# Patient Record
Sex: FEMALE | Race: WHITE | Employment: OTHER | URBAN - METROPOLITAN AREA
[De-identification: names, ages, dates, MRNs, and addresses within clinical notes are randomized per-mention and may not be internally consistent; named-entity substitution may affect disease eponyms.]

---

## 2017-06-21 ENCOUNTER — APPOINTMENT (OUTPATIENT)
Dept: GENERAL RADIOLOGY | Age: 77
DRG: 481 | End: 2017-06-21
Attending: EMERGENCY MEDICINE
Payer: MEDICARE

## 2017-06-21 ENCOUNTER — ANESTHESIA EVENT (OUTPATIENT)
Dept: SURGERY | Age: 77
DRG: 481 | End: 2017-06-21
Payer: MEDICARE

## 2017-06-21 ENCOUNTER — HOSPITAL ENCOUNTER (INPATIENT)
Age: 77
LOS: 5 days | Discharge: NURSING, REHAB OR LONG-TERM CARE | DRG: 481 | End: 2017-06-26
Attending: EMERGENCY MEDICINE | Admitting: EMERGENCY MEDICINE
Payer: MEDICARE

## 2017-06-21 ENCOUNTER — APPOINTMENT (OUTPATIENT)
Dept: GENERAL RADIOLOGY | Age: 77
DRG: 481 | End: 2017-06-21
Attending: HOSPITALIST
Payer: MEDICARE

## 2017-06-21 DIAGNOSIS — M25.552 ACUTE HIP PAIN, LEFT: ICD-10-CM

## 2017-06-21 DIAGNOSIS — S72.002A CLOSED LEFT HIP FRACTURE, INITIAL ENCOUNTER (HCC): ICD-10-CM
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
## 2017-06-21 PROBLEM — F03.90 DEMENTIA (HCC): Status: ACTIVE | Noted: 2017-06-21

## 2017-06-21 PROBLEM — E87.1 HYPONATREMIA: Status: ACTIVE | Noted: 2017-06-21

## 2017-06-21 LAB
ALBUMIN SERPL BCP-MCNC: 3.6 G/DL (ref 3.4–5)
ALBUMIN/GLOB SERPL: 1.4 {RATIO} (ref 0.8–1.7)
ALP SERPL-CCNC: 42 U/L (ref 45–117)
ALT SERPL-CCNC: 28 U/L (ref 13–56)
ANION GAP BLD CALC-SCNC: 10 MMOL/L (ref 3–18)
ANION GAP BLD CALC-SCNC: 12 MMOL/L (ref 3–18)
ANION GAP BLD CALC-SCNC: 12 MMOL/L (ref 3–18)
ANION GAP BLD CALC-SCNC: 14 MMOL/L (ref 3–18)
ANION GAP BLD CALC-SCNC: 17 MMOL/L (ref 3–18)
APPEARANCE UR: CLEAR
APTT PPP: 29.3 SEC (ref 23–36.4)
AST SERPL W P-5'-P-CCNC: 46 U/L (ref 15–37)
BASOPHILS # BLD AUTO: 0 K/UL (ref 0–0.1)
BASOPHILS # BLD: 0 % (ref 0–3)
BILIRUB SERPL-MCNC: 0.3 MG/DL (ref 0.2–1)
BILIRUB UR QL: NEGATIVE
BUN SERPL-MCNC: 19 MG/DL (ref 7–18)
BUN SERPL-MCNC: 19 MG/DL (ref 7–18)
BUN SERPL-MCNC: 20 MG/DL (ref 7–18)
BUN SERPL-MCNC: 21 MG/DL (ref 7–18)
BUN SERPL-MCNC: 25 MG/DL (ref 7–18)
BUN/CREAT SERPL: 12 (ref 12–20)
BUN/CREAT SERPL: 13 (ref 12–20)
CALCIUM SERPL-MCNC: 7.4 MG/DL (ref 8.5–10.1)
CALCIUM SERPL-MCNC: 7.4 MG/DL (ref 8.5–10.1)
CALCIUM SERPL-MCNC: 7.5 MG/DL (ref 8.5–10.1)
CALCIUM SERPL-MCNC: 7.7 MG/DL (ref 8.5–10.1)
CALCIUM SERPL-MCNC: 8.3 MG/DL (ref 8.5–10.1)
CHLORIDE SERPL-SCNC: 92 MMOL/L (ref 100–108)
CHLORIDE SERPL-SCNC: 96 MMOL/L (ref 100–108)
CHLORIDE SERPL-SCNC: 97 MMOL/L (ref 100–108)
CHLORIDE SERPL-SCNC: 97 MMOL/L (ref 100–108)
CHLORIDE SERPL-SCNC: 98 MMOL/L (ref 100–108)
CK MB CFR SERPL CALC: NORMAL % (ref 0–4)
CK MB SERPL-MCNC: <1 NG/ML (ref 5–25)
CK SERPL-CCNC: 35 U/L (ref 26–192)
CO2 SERPL-SCNC: 13 MMOL/L (ref 21–32)
CO2 SERPL-SCNC: 16 MMOL/L (ref 21–32)
CO2 SERPL-SCNC: 17 MMOL/L (ref 21–32)
CO2 SERPL-SCNC: 18 MMOL/L (ref 21–32)
CO2 SERPL-SCNC: 19 MMOL/L (ref 21–32)
COLOR UR: YELLOW
CREAT SERPL-MCNC: 1.56 MG/DL (ref 0.6–1.3)
CREAT SERPL-MCNC: 1.57 MG/DL (ref 0.6–1.3)
CREAT SERPL-MCNC: 1.59 MG/DL (ref 0.6–1.3)
CREAT SERPL-MCNC: 1.78 MG/DL (ref 0.6–1.3)
CREAT SERPL-MCNC: 2.06 MG/DL (ref 0.6–1.3)
DIFFERENTIAL METHOD BLD: ABNORMAL
EOSINOPHIL # BLD: 0 K/UL (ref 0–0.4)
EOSINOPHIL NFR BLD: 0 % (ref 0–5)
ERYTHROCYTE [DISTWIDTH] IN BLOOD BY AUTOMATED COUNT: 12.4 % (ref 11.6–14.5)
GLOBULIN SER CALC-MCNC: 2.5 G/DL (ref 2–4)
GLUCOSE SERPL-MCNC: 101 MG/DL (ref 74–99)
GLUCOSE SERPL-MCNC: 166 MG/DL (ref 74–99)
GLUCOSE SERPL-MCNC: 77 MG/DL (ref 74–99)
GLUCOSE SERPL-MCNC: 80 MG/DL (ref 74–99)
GLUCOSE SERPL-MCNC: 82 MG/DL (ref 74–99)
GLUCOSE UR STRIP.AUTO-MCNC: NEGATIVE MG/DL
HCT VFR BLD AUTO: 27.6 % (ref 35–45)
HGB BLD-MCNC: 9.8 G/DL (ref 12–16)
HGB UR QL STRIP: NEGATIVE
INR PPP: 1.2 (ref 0.8–1.2)
KETONES UR QL STRIP.AUTO: NEGATIVE MG/DL
LEUKOCYTE ESTERASE UR QL STRIP.AUTO: NEGATIVE
LYMPHOCYTES # BLD AUTO: 4 % (ref 20–51)
LYMPHOCYTES # BLD: 0.9 K/UL (ref 0.8–3.5)
MCH RBC QN AUTO: 36.2 PG (ref 24–34)
MCHC RBC AUTO-ENTMCNC: 35.5 G/DL (ref 31–37)
MCV RBC AUTO: 101.8 FL (ref 74–97)
MONOCYTES # BLD: 1.6 K/UL (ref 0–1)
MONOCYTES NFR BLD AUTO: 7 % (ref 2–9)
NEUTS BAND NFR BLD MANUAL: 6 % (ref 0–5)
NEUTS SEG # BLD: 18.8 K/UL (ref 1.8–8)
NEUTS SEG NFR BLD AUTO: 83 % (ref 42–75)
NITRITE UR QL STRIP.AUTO: NEGATIVE
PH UR STRIP: 5 [PH] (ref 5–8)
PLATELET # BLD AUTO: 147 K/UL (ref 135–420)
PMV BLD AUTO: 11.1 FL (ref 9.2–11.8)
POTASSIUM SERPL-SCNC: 5 MMOL/L (ref 3.5–5.5)
POTASSIUM SERPL-SCNC: 5.1 MMOL/L (ref 3.5–5.5)
POTASSIUM SERPL-SCNC: 5.4 MMOL/L (ref 3.5–5.5)
POTASSIUM SERPL-SCNC: 5.6 MMOL/L (ref 3.5–5.5)
POTASSIUM SERPL-SCNC: 5.7 MMOL/L (ref 3.5–5.5)
PROT SERPL-MCNC: 6.1 G/DL (ref 6.4–8.2)
PROT UR STRIP-MCNC: NEGATIVE MG/DL
PROTHROMBIN TIME: 14.4 SEC (ref 11.5–15.2)
RBC # BLD AUTO: 2.71 M/UL (ref 4.2–5.3)
RBC MORPH BLD: ABNORMAL
RBC MORPH BLD: ABNORMAL
SODIUM SERPL-SCNC: 122 MMOL/L (ref 136–145)
SODIUM SERPL-SCNC: 126 MMOL/L (ref 136–145)
SODIUM SERPL-SCNC: 126 MMOL/L (ref 136–145)
SODIUM SERPL-SCNC: 127 MMOL/L (ref 136–145)
SODIUM SERPL-SCNC: 127 MMOL/L (ref 136–145)
SP GR UR REFRACTOMETRY: 1.01 (ref 1–1.03)
TROPONIN I SERPL-MCNC: <0.02 NG/ML (ref 0–0.06)
UROBILINOGEN UR QL STRIP.AUTO: 0.2 EU/DL (ref 0.2–1)
WBC # BLD AUTO: 22.7 K/UL (ref 4.6–13.2)
WBC MORPH BLD: ABNORMAL

## 2017-06-21 PROCEDURE — 74011250636 HC RX REV CODE- 250/636: Performed by: HOSPITALIST

## 2017-06-21 PROCEDURE — 81003 URINALYSIS AUTO W/O SCOPE: CPT | Performed by: HOSPITALIST

## 2017-06-21 PROCEDURE — 86900 BLOOD TYPING SEROLOGIC ABO: CPT | Performed by: EMERGENCY MEDICINE

## 2017-06-21 PROCEDURE — 85025 COMPLETE CBC W/AUTO DIFF WBC: CPT | Performed by: EMERGENCY MEDICINE

## 2017-06-21 PROCEDURE — 73562 X-RAY EXAM OF KNEE 3: CPT

## 2017-06-21 PROCEDURE — 80053 COMPREHEN METABOLIC PANEL: CPT | Performed by: EMERGENCY MEDICINE

## 2017-06-21 PROCEDURE — 74011000250 HC RX REV CODE- 250: Performed by: HOSPITALIST

## 2017-06-21 PROCEDURE — 73502 X-RAY EXAM HIP UNI 2-3 VIEWS: CPT

## 2017-06-21 PROCEDURE — 74011250636 HC RX REV CODE- 250/636: Performed by: EMERGENCY MEDICINE

## 2017-06-21 PROCEDURE — 77030034849

## 2017-06-21 PROCEDURE — 84443 ASSAY THYROID STIM HORMONE: CPT | Performed by: HOSPITALIST

## 2017-06-21 PROCEDURE — 77010033678 HC OXYGEN DAILY

## 2017-06-21 PROCEDURE — 74011250637 HC RX REV CODE- 250/637: Performed by: HOSPITALIST

## 2017-06-21 PROCEDURE — 36415 COLL VENOUS BLD VENIPUNCTURE: CPT | Performed by: HOSPITALIST

## 2017-06-21 PROCEDURE — 96374 THER/PROPH/DIAG INJ IV PUSH: CPT

## 2017-06-21 PROCEDURE — 65270000029 HC RM PRIVATE

## 2017-06-21 PROCEDURE — 99285 EMERGENCY DEPT VISIT HI MDM: CPT

## 2017-06-21 PROCEDURE — 96376 TX/PRO/DX INJ SAME DRUG ADON: CPT

## 2017-06-21 PROCEDURE — 96361 HYDRATE IV INFUSION ADD-ON: CPT

## 2017-06-21 PROCEDURE — 93005 ELECTROCARDIOGRAM TRACING: CPT

## 2017-06-21 PROCEDURE — 82550 ASSAY OF CK (CPK): CPT | Performed by: EMERGENCY MEDICINE

## 2017-06-21 PROCEDURE — 85730 THROMBOPLASTIN TIME PARTIAL: CPT | Performed by: EMERGENCY MEDICINE

## 2017-06-21 PROCEDURE — 71010 XR CHEST PORT: CPT

## 2017-06-21 PROCEDURE — 80048 BASIC METABOLIC PNL TOTAL CA: CPT | Performed by: HOSPITALIST

## 2017-06-21 PROCEDURE — 74011250636 HC RX REV CODE- 250/636

## 2017-06-21 PROCEDURE — 86920 COMPATIBILITY TEST SPIN: CPT | Performed by: EMERGENCY MEDICINE

## 2017-06-21 PROCEDURE — 85610 PROTHROMBIN TIME: CPT | Performed by: EMERGENCY MEDICINE

## 2017-06-21 RX ORDER — DONEPEZIL HYDROCHLORIDE 5 MG/1
5 TABLET, FILM COATED ORAL
Status: DISCONTINUED | OUTPATIENT
Start: 2017-06-22 | End: 2017-06-26 | Stop reason: HOSPADM

## 2017-06-21 RX ORDER — SODIUM CHLORIDE TAB 1 GM 1 G
1 TAB MISCELLANEOUS DAILY
COMMUNITY

## 2017-06-21 RX ORDER — RANITIDINE 150 MG/1
150 TABLET, FILM COATED ORAL 2 TIMES DAILY
Status: DISCONTINUED | OUTPATIENT
Start: 2017-06-21 | End: 2017-06-21 | Stop reason: CLARIF

## 2017-06-21 RX ORDER — RANITIDINE 150 MG/1
150 TABLET, FILM COATED ORAL 2 TIMES DAILY
COMMUNITY

## 2017-06-21 RX ORDER — KETOROLAC TROMETHAMINE 30 MG/ML
30 INJECTION, SOLUTION INTRAMUSCULAR; INTRAVENOUS
Status: DISCONTINUED | OUTPATIENT
Start: 2017-06-21 | End: 2017-06-26 | Stop reason: HOSPADM

## 2017-06-21 RX ORDER — SODIUM CHLORIDE TAB 1 GM 1 G
1 TAB MISCELLANEOUS DAILY
Status: DISCONTINUED | OUTPATIENT
Start: 2017-06-21 | End: 2017-06-26 | Stop reason: HOSPADM

## 2017-06-21 RX ORDER — HYDROXYUREA 500 MG/1
500 CAPSULE ORAL DAILY
Status: ON HOLD | COMMUNITY
End: 2017-06-21

## 2017-06-21 RX ORDER — SODIUM POLYSTYRENE SULFONATE 15 G/60ML
15 SUSPENSION ORAL; RECTAL
Status: COMPLETED | OUTPATIENT
Start: 2017-06-21 | End: 2017-06-21

## 2017-06-21 RX ORDER — FAMOTIDINE 20 MG/1
20 TABLET, FILM COATED ORAL 2 TIMES DAILY
Status: DISCONTINUED | OUTPATIENT
Start: 2017-06-21 | End: 2017-06-26 | Stop reason: HOSPADM

## 2017-06-21 RX ORDER — ONDANSETRON 2 MG/ML
4 INJECTION INTRAMUSCULAR; INTRAVENOUS
Status: DISCONTINUED | OUTPATIENT
Start: 2017-06-21 | End: 2017-06-26 | Stop reason: SDUPTHER

## 2017-06-21 RX ORDER — BISMUTH SUBSALICYLATE 262 MG
1 TABLET,CHEWABLE ORAL DAILY
COMMUNITY

## 2017-06-21 RX ORDER — LATANOPROST 50 UG/ML
1 SOLUTION/ DROPS OPHTHALMIC
Status: DISCONTINUED | OUTPATIENT
Start: 2017-06-21 | End: 2017-06-26 | Stop reason: HOSPADM

## 2017-06-21 RX ORDER — MORPHINE SULFATE 2 MG/ML
1 INJECTION, SOLUTION INTRAMUSCULAR; INTRAVENOUS
Status: DISCONTINUED | OUTPATIENT
Start: 2017-06-21 | End: 2017-06-26 | Stop reason: HOSPADM

## 2017-06-21 RX ORDER — DONEPEZIL HYDROCHLORIDE 5 MG/1
5 TABLET, FILM COATED ORAL
Status: ON HOLD | COMMUNITY
End: 2017-06-21

## 2017-06-21 RX ORDER — ONDANSETRON 4 MG/1
4 TABLET, FILM COATED ORAL
COMMUNITY

## 2017-06-21 RX ORDER — SODIUM CHLORIDE 9 MG/ML
100 INJECTION, SOLUTION INTRAVENOUS CONTINUOUS
Status: DISCONTINUED | OUTPATIENT
Start: 2017-06-21 | End: 2017-06-23

## 2017-06-21 RX ORDER — PANTOPRAZOLE SODIUM 40 MG/1
40 TABLET, DELAYED RELEASE ORAL DAILY
Status: CANCELLED | OUTPATIENT
Start: 2017-06-21

## 2017-06-21 RX ORDER — MORPHINE SULFATE 2 MG/ML
INJECTION, SOLUTION INTRAMUSCULAR; INTRAVENOUS
Status: COMPLETED
Start: 2017-06-21 | End: 2017-06-21

## 2017-06-21 RX ORDER — MORPHINE SULFATE 2 MG/ML
2 INJECTION, SOLUTION INTRAMUSCULAR; INTRAVENOUS
Status: DISCONTINUED | OUTPATIENT
Start: 2017-06-21 | End: 2017-06-21

## 2017-06-21 RX ORDER — MORPHINE SULFATE 2 MG/ML
1 INJECTION, SOLUTION INTRAMUSCULAR; INTRAVENOUS
Status: DISCONTINUED | OUTPATIENT
Start: 2017-06-21 | End: 2017-06-21

## 2017-06-21 RX ORDER — ERGOCALCIFEROL 1.25 MG/1
50000 CAPSULE ORAL
COMMUNITY

## 2017-06-21 RX ORDER — ACETAMINOPHEN 325 MG/1
650 TABLET ORAL
Status: DISCONTINUED | OUTPATIENT
Start: 2017-06-21 | End: 2017-06-26 | Stop reason: HOSPADM

## 2017-06-21 RX ORDER — DIAZEPAM 2 MG/1
2 TABLET ORAL
Status: DISCONTINUED | OUTPATIENT
Start: 2017-06-21 | End: 2017-06-26 | Stop reason: HOSPADM

## 2017-06-21 RX ADMIN — DIAZEPAM 2 MG: 2 TABLET ORAL at 15:57

## 2017-06-21 RX ADMIN — Medication 1 MG: at 09:57

## 2017-06-21 RX ADMIN — SODIUM CHLORIDE 100 ML/HR: 900 INJECTION, SOLUTION INTRAVENOUS at 06:10

## 2017-06-21 RX ADMIN — MULTIPLE VITAMINS W/ MINERALS TAB 1 TABLET: TAB at 15:40

## 2017-06-21 RX ADMIN — LATANOPROST 1 DROP: 50 SOLUTION OPHTHALMIC at 22:54

## 2017-06-21 RX ADMIN — FAMOTIDINE 20 MG: 20 TABLET ORAL at 20:41

## 2017-06-21 RX ADMIN — SODIUM CHLORIDE TAB 1 GM 1 G: 1 TAB at 15:39

## 2017-06-21 RX ADMIN — SODIUM CHLORIDE 1000 ML: 900 INJECTION, SOLUTION INTRAVENOUS at 01:30

## 2017-06-21 RX ADMIN — Medication 2 MG: at 04:00

## 2017-06-21 RX ADMIN — Medication 1 MG: at 20:41

## 2017-06-21 RX ADMIN — Medication 1 MG: at 17:58

## 2017-06-21 RX ADMIN — Medication 2 MG: at 01:30

## 2017-06-21 RX ADMIN — DIAZEPAM 2 MG: 2 TABLET ORAL at 07:34

## 2017-06-21 RX ADMIN — SODIUM POLYSTYRENE SULFONATE 15 G: 15 SUSPENSION ORAL; RECTAL at 20:42

## 2017-06-21 RX ADMIN — Medication 1 MG: at 13:36

## 2017-06-21 NOTE — CONSULTS
Consult Note    Patient: Emma Brandt               Sex: female          DOA: 6/21/2017         YOB: 1940      Age:  64 y.o.        LOS:  LOS: 0 days              HPI:     Emma Brandt is a 64 y.o. female who has been seen for left hip pain after witnessed fall at home  Patient transported to Ed for evaluation  Noted left hip fracture  Complicated medical history admitted to medical service  Noted dehydration and hyponatremia  Pending clearance for OR    Past Medical History:   Diagnosis Date    Autoimmune disease ([address]     Chron's    Calculus of kidney     Chronic kidney disease     stage 3   from stone    Chronic kidney disease     Closed left hip fracture ([address] 6/21/2017    Crohn's disease ([address]     Epiretinal membrane     Glaucoma     Glaucoma     Gout     Hearing loss     Hernia of abdominal cavity     History of esophagogastroduodenoscopy (EGD)     Hypertension 2000    Ileostomy present ([address]     Memory problem     Menopause     Myelodysplasia (myelodysplastic syndrome) ([address]     Osteopenia        Past Surgical History:   Procedure Laterality Date    HX ABDOMINAL LAPAROSCOPY      HX APPENDECTOMY      HX BREAST BIOPSY      HX CATARACT REMOVAL      HX COLECTOMY      HX GYN      pelvic cysts    HX OTHER SURGICAL      ilieostomy    HX OTHER SURGICAL      bone biopsy    HX SHOULDER ARTHROSCOPY      right    HX TONSILLECTOMY         Family History   Problem Relation Age of Onset    Malignant Hyperthermia Neg Hx     Pseudocholinesterase Deficiency Neg Hx     Delayed Awakening Neg Hx     Post-op Nausea/Vomiting Neg Hx     Emergence Delirium Neg Hx     Post-op Cognitive Dysfunction Neg Hx     Other Neg Hx        Social History     Social History    Marital status:      Spouse name: N/A    Number of children: N/A    Years of education: N/A     Social History Main Topics    Smoking status: Former Smoker     Quit date: 1/1/1980    Smokeless tobacco: Never Used  Alcohol use 7.0 oz/week     7 Glasses of wine, 7 Shots of liquor per week    Drug use: No    Sexual activity: Not Currently     Partners: Male     Other Topics Concern    None     Social History Narrative       Prior to Admission medications    Medication Sig Start Date End Date Taking? Authorizing Provider   raNITIdine (ZANTAC) 150 mg tablet Take 150 mg by mouth two (2) times a day. Yes Jae Colon MD   ondansetron hcl (ZOFRAN, AS HYDROCHLORIDE,) 4 mg tablet Take 4 mg by mouth every twelve (12) hours as needed for Nausea. Yes Jae Colon MD   multivitamin (ONE A DAY) tablet Take 1 Tab by mouth daily. Indications: VITAMIN DEFICIENCY   Yes Historical Provider   ergocalciferol (VITAMIN D2) 50,000 unit capsule Take 50,000 Units by mouth. Yes Historical Provider   sodium chloride 1 gram tablet Take 1 g by mouth daily. Yes Historical Provider   Cetirizine (ZYRTEC) 10 mg cap Take  by mouth as needed. Yes Historical Provider   ergocalciferol, vitamin d2, 1,000 unit cap Take  by mouth. Yes Historical Provider   latanoprost (XALATAN) 0.005 % ophthalmic solution Administer 1 Drop to both eyes nightly. Yes Historical Provider   ezetimibe (ZETIA) 10 mg tablet Take  by mouth. Yes Historical Provider   pantoprazole (PROTONIX) 40 mg tablet Take 1 Tab by mouth daily. 2/22/16   Norma Byrne MD   oxyCODONE-acetaminophen (PERCOCET) 5-325 mg per tablet Take 1 Tab by mouth every four (4) hours as needed for Pain. Jae Colon MD   potassium & sodium citrate-citric acid (POLYCITRA-LC) 550-500-334 mg/5 mL solution Take 30 mL by mouth three (3) times daily (with meals). Jae Colon MD   valACYclovir (VALTREX) 500 mg tablet Take 1,000 mg by mouth two (2) times a day.     Jae Colon MD       Allergies   Allergen Reactions    Atorvastatin Hives    Levofloxacin Nausea and Vomiting    Losartan Drowsiness    Ramipril Other (comments)    Simvastatin Shortness of Breath    Statins-Hmg-Coa Reductase Inhibitors Other (comments)     Body aches       Review of Systems  Constitutional: negative for fevers, chills and sweats  Respiratory: negative for negative, cough or sputum  Cardiovascular: negative for chest pain, chest pressure/discomfort, dyspnea  Gastrointestinal: negative for dysphagia, nausea and vomiting  Musculoskeletal:positive for myalgias, arthralgias and stiff joints  Neurological: negative for tremor and weakness      Physical Exam:      Visit Vitals    /59    Pulse 66    Temp 98.3 °F (36.8 °C)    Resp 16    Ht 4' 11\" (1.499 m)    Wt 47.6 kg (105 lb)    SpO2 100%    BMI 21.21 kg/m2       Physical Exam:  General: Alert and Oriented X 3  Lungs: Clear to ausculation bilaterally  Cardiovascular: Regular Rate and Rhythm, without murmur  Abdomen: Soft, nontender with positive bowel sounds in all four quadrants  Gential/Rectal: deferred  Musculoskeletal:left leg shortened external rotated  Gross motor and sensory intact  Abrasion left elbow    Labs Reviewed:  BMP:   Lab Results   Component Value Date/Time     (L) 06/21/2017 02:30 AM    K 5.0 06/21/2017 02:30 AM    CL 92 (L) 06/21/2017 02:30 AM    CO2 13 (L) 06/21/2017 02:30 AM    AGAP 17 06/21/2017 02:30 AM    GLU 82 06/21/2017 02:30 AM    BUN 25 (H) 06/21/2017 02:30 AM    CREA 2.06 (H) 06/21/2017 02:30 AM    GFRAA 28 (L) 06/21/2017 02:30 AM    GFRNA 23 (L) 06/21/2017 02:30 AM     CMP:   Lab Results   Component Value Date/Time     (L) 06/21/2017 02:30 AM    K 5.0 06/21/2017 02:30 AM    CL 92 (L) 06/21/2017 02:30 AM    CO2 13 (L) 06/21/2017 02:30 AM    AGAP 17 06/21/2017 02:30 AM    GLU 82 06/21/2017 02:30 AM    BUN 25 (H) 06/21/2017 02:30 AM    CREA 2.06 (H) 06/21/2017 02:30 AM    GFRAA 28 (L) 06/21/2017 02:30 AM    GFRNA 23 (L) 06/21/2017 02:30 AM    CA 8.3 (L) 06/21/2017 02:30 AM    ALB 3.6 06/21/2017 02:30 AM    TP 6.1 (L) 06/21/2017 02:30 AM    GLOB 2.5 06/21/2017 02:30 AM    AGRAT 1.4 06/21/2017 02:30 AM    SGOT 46 (H) 06/21/2017 02:30 AM    ALT 28 06/21/2017 02:30 AM     CBC:   Lab Results   Component Value Date/Time    WBC 22.7 (H) 06/21/2017 02:30 AM    HGB 9.8 (L) 06/21/2017 02:30 AM    HCT 27.6 (L) 06/21/2017 02:30 AM     06/21/2017 02:30 AM       Assessment/Plan     Principal Problem:    Closed left hip fracture (HCC) (6/21/2017)    Active Problems:    Myelodysplasia (myelodysplastic syndrome) (HCC) ()      Crohn's disease (HCC) ()      Hypertension ()      CKD (chronic kidney disease) stage 3, GFR 30-59 ml/min ()      Overview: stage 3   from stone      Hyponatremia (6/21/2017)      Dementia (6/21/2017)        Left Intertrochanteric fracture  electrolyte imbalance  Pending medical clearance for fixation of left hip

## 2017-06-21 NOTE — ROUTINE PROCESS
TRANSFER - IN REPORT:    Verbal report received from Gloria Wren RN (name) on Titus Edwards  being received from ED (unit) for routine progression of care      Report consisted of patients Situation, Background, Assessment and   Recommendations(SBAR). Information from the following report(s) SBAR, Kardex, ED Summary, Procedure Summary, Intake/Output, MAR, Recent Results and Med Rec Status was reviewed with the receiving nurse. Opportunity for questions and clarification was provided. Assessment completed upon patients arrival to unit and care assumed.

## 2017-06-21 NOTE — ROUTINE PROCESS
TRANSFER - OUT REPORT:    Verbal report given to Jayjay Bosch RN (name) on Mikey Butler  being transferred to Medical (unit) for routine progression of care       Report consisted of patients Situation, Background, Assessment and   Recommendations(SBAR). Information from the following report(s) SBAR, ED Summary, MAR, Recent Results and Cardiac Rhythm NSR was reviewed with the receiving nurse. Lines:   Peripheral IV 06/21/17 Right Wrist (Active)   Site Assessment Clean, dry, & intact 6/21/2017  1:32 AM   Phlebitis Assessment 0 6/21/2017  1:32 AM   Infiltration Assessment 0 6/21/2017  1:32 AM   Dressing Status Clean, dry, & intact 6/21/2017  1:32 AM   Dressing Type Transparent 6/21/2017  1:32 AM   Hub Color/Line Status Blue 6/21/2017  1:32 AM   Action Taken Catheter retaped 6/21/2017  1:32 AM   Alcohol Cap Used Yes 6/21/2017  1:32 AM        Opportunity for questions and clarification was provided.       Patient transported with:   O2 @ 2 liters  Tech

## 2017-06-21 NOTE — PROGRESS NOTES
Pt admitted due to a fall resulting in closed eft hip fracture. Pt with a history of crohn's disease with a colostomy, mild dementia, hyponatremia. Per chart review pt lives with her  at [facility].  Noted pt with an ortho consult with likely surgery. CM to await therapy recommendation following surgical intervention. CM to continue to follow and assist with plan of care needs. Discharge Reassessment Plan:  High Risk and MSSP/Good Help ACO patients    RRAT Score:  21 or greater    High Risk Care Transition Interventions:  1. Discharge transition plan:  HH vs SNF  2. Involved patient/caregiver in assessment, planning, education and implement of intervention. 3. CM daily patient care huddles/interdisciplinary rounds were completed. 4. PCP/Specialist appointment to be scheduled within 48 hours unless otherwise specified. 5. Facilitated transportation and logistics for follow-up appointments. 6. Facilitated Medication reconciliation  Pharmacy. Date/Time  7. Formal handoff between hospital provider and post-acute provider to transition patient completed. 8. Handoff to [facility] Nurse Navigator or PCP practice completed. Discharge follow-up phone call within 2  4 days (NN, [facility], Nursing) CM follow up as assigned. Care Management Interventions  PCP Verified by CM: Yes  Mode of Transport at Discharge:  Other (see comment) (Family)  Transition of Care Consult (CM Consult): Home Health, SNF  Health Maintenance Reviewed: Yes  Physical Therapy Consult: Yes  Occupational Therapy Consult: Yes  Current Support Network: Lives with Spouse, Assisted Living  Confirm Follow Up Transport: Family

## 2017-06-21 NOTE — ED NOTES
Pt is resting in a position of comfort on stretcher with spouse at bedside. Pt remains on cardiac monitor and will continue to monitor. Pt and spouse updated on plan of care waiting on test results. Pt and spouse verbalized understanding. Bed in lowest locked position, side rails up x 2 and call bell in reach of patient and patient instructed to use call bell for any assistance needed.

## 2017-06-21 NOTE — ROUTINE PROCESS
Bedside and Verbal shift change report given to [facility], RN (oncoming nurse) by Stevie Monreal RN (offgoing nurse). Report included the following information SBAR, Kardex, ED Summary, Procedure Summary, Intake/Output, MAR, Recent Results and Med Rec Status.

## 2017-06-21 NOTE — PROGRESS NOTES
Progress Note      Patient: Juliet Mcintosh               Sex: female          DOA: 6/21/2017         YOB: 1940      Age:  64 y.o.        LOS:  LOS: 0 days               Subjective:     Juliet Mcintosh is a 64 y.o. female who c/o left hip pain   Medical team advising continued electrolyte correction before surgery    Objective:      Visit Vitals    /67    Pulse 69    Temp 97.8 °F (36.6 °C)    Resp 16    Ht 4' 11\" (1.499 m)    Wt 47.6 kg (105 lb)    SpO2 100%    BMI 21.21 kg/m2           Assessment/Plan     Principal Problem:    Closed left hip fracture (HCC) (6/21/2017)    Active Problems:    Myelodysplasia (myelodysplastic syndrome) (HCC) ()      Crohn's disease (HCC) ()      Hypertension ()      CKD (chronic kidney disease) stage 3, GFR 30-59 ml/min ()      Overview: stage 3   from stone      Hyponatremia (6/21/2017)      Dementia (6/21/2017)        Will plan for fixation of left hip tomorrow afternoon  NPO after midnight  continued hydration  Follow am labs for potential surgery  tomorrow

## 2017-06-21 NOTE — PROGRESS NOTES
conducted an initial consultation and Spiritual Assessment for Karthik Fatima, who is a 64 y.o.,female. Patients Primary Language is: [employer]. According to the patients EMR Taoist Affiliation is: [place]. The reason the Patient came to the hospital is:   Patient Active Problem List    Diagnosis Date Noted    Closed left hip fracture ([facility] 06/21/2017    Hyponatremia 06/21/2017    Dementia 06/21/2017    Myelodys[facility] ([facility]elodysplastic syndrome) ([facility]     [facility]'s disease ([facility]     Hypertension     CKD (chronic kidney disease) stage 3, GFR 30-59 ml/min         The  provided the following Interventions:  Initiated a relationship of care and support. Explored issues of brenda, belief, spirituality and Jainism/ritual needs while hospitalized. Listened empathically. Provided chaplaincy education. Provided information about Spiritual Care Services. Offered prayer and assurance of continued prayers on patients behalf. Chart reviewed. The following outcomes were achieved:  Patient shared limited information about both their medical narrative and spiritual journey/beliefs. Patient processed feeling about current hospitalization. Patient expressed gratitude for pastoral care visit. Assessment:  Patient does not have any Jainism/cultural needs that will affect patients preferences in health care. There are no further spiritual or Jainism issues which require intervention at this time. Plan:  Chaplains will continue to follow and will provide pastoral care on an as needed/requested basis.  recommends bedside caregivers page  on duty if patient shows signs of acute spiritual or emotional distress.       Sister Lea Verde, [place], [address]  [phone]

## 2017-06-21 NOTE — PROGRESS NOTES
Physical Therapy Screening:  Services are indicated and therapy order is required. An InBasket screening referral was triggered for physical therapy based on results obtained during the nursing admission assessment. The patients chart was reviewed and the patient is appropriate for a skilled therapy evaluation, After surgical intervention. Please order a consult for physical therapy, and any WB restrictions,  if you are in agreement and would like an evaluation to be completed. Thank you.     Francine Bird, PTA

## 2017-06-21 NOTE — ED NOTES
Alerted to patients room by monitor, patients PO2 SATs noted to be in the high [de-identified] MD gave verbal orders for 2 PO2 NC, which patient responded immediately to with PO2 SATs at 100% on 2L NC.

## 2017-06-21 NOTE — PROGRESS NOTES
Hospitalist Progress Note    Patient: Jessica Stoddard MRN: [id number]  CSN: [id number]    YOB: 1940  Age: 64 y.o.   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

## 2017-06-21 NOTE — ED NOTES
Clarified with Dr Jose De Jesus Guadarrama about narcotic pain medication and patients blood pressure. Dr Jose De Jesus Guadarrama agrees to give medication and to maintain patients blood pressure with a MAP over 65 with IV fluids.

## 2017-06-21 NOTE — H&P
[address] [place] [address]  ROUTINE HISTORY AND PHYSICAL    Name:  Josephine Steele  MR#:  [id number]  :  1940  Account #:  [de-identified]  Date of Adm:  2017      ADMITTING DIAGNOSES  1. Left hip fracture. 2. History of Crohn disease with colostomy. 3. Hyponatremia. 4. Chronic kidney disease stage 3.  5. Dehydration. 6. Myelodysplasia. 7. History of gastrointestinal bleeding. 8. Mild dementia. HISTORY OF PRESENT ILLNESS: This is a 73-year-old lady who  lives at home with her . She got up to go to the bathroom last  night and states that her leg gave out on her when she got up from the  toilet. She fell over and had immediate pain in her left hip, but was able  to get up to some degree. She called her  who brought her  into the emergency room. She was discovered to have a left hip  fracture and was admitted to the medical service and Orthopedics was  consulted from the emergency room. Labs were done in the  emergency room, but when I was notified of the admission, no results  were available; however, in addition to her hip fracture, her laboratory  values indicate a notably sodium at 122, a creatinine at 2.06, and a  normal troponin. There is no notable history for syncope or dizziness  prior to this fall. She has no history of cardiac disease, no respiratory  illness. Her  does note that she was hospitalized in 2017 at  [facility] for severe hyponatremia and dehydration. He states  that when she gets feeling poorly she does not eat or drink for a  number of days, her sodium will go down, which is what happened to  her this last time and subsequently she required a hospitalization. She  was given hypertonic saline during that stay and he states that she  also had a hypercorrection of sodium, but she also at that time had a  creatinine of 6 and was in fulminant renal failure.  She sees Dr. Adolfo Ambrosio  as her nephrologist, Dr. Natacha Parr is her primary care internist, and Dr. Freeman Stiles is her oncologist to see her for myelodysplasia. PAST MEDICAL HISTORY: Crohn's disease with colostomy,  hyperlipidemia, dementia, myelodysplasia, vitamin D deficiency, history  of GI bleeding, history of hyponatremia. PAST SURGICAL HISTORY: She has had a total colectomy, a biopsy  for bone marrow. She has had previous EGD. She has had an  appendectomy, tonsillectomy, and cataract extraction. SOCIAL HISTORY: She used to smoke; she quit in 1978. She drinks  1-2 alcoholic drinks at night daily. She lives at home with her . She is usually ambulatory. ALLERGIES: SHE HAS NO MEDICATION ALLERGIES. CURRENT MEDICATIONS  1. Latanoprost eye drops. 2. Zetia. 3. Zantac. 4. Vitamin D.  5. Aricept. 6. One-A-Day vitamin. OTHER MEDICAL HISTORY: Includes gout, osteopenia, chronic  kidney disease stage 3. REVIEW OF SYSTEMS  GENERAL: She denies any fevers, chills, or upper respiratory infection  symptoms. CARDIOVASCULAR: She denies chest pain, palpitations, leg swelling. RESPIRATORY: She denies any shortness of breath, wheezing or  cough. GASTROINTESTINAL: She denies any recent bleeding in her  colostomy bag. No hematemesis. No abdominal pain. No nausea or  vomiting. GENITOURINARY: She denies any difficulty urinating, dysuria or  hematuria. MUSCULOSKELETAL: She complains of pain and spasm in her left  hip after this fall, but none prior to the fall. HEMATOLOGIC: She denies any bleeding or easy bruisability recently,  although she is seen chronically every 3 months for myelodysplasia,  and her most recent hemoglobin was acceptable per her . PHYSICAL EXAMINATION  GENERAL: The patient is a 68-year-old, chronically ill-appearing white  female. She has some mild dementia and hearing loss, so she is not a  very good historian, but her  is excellent. She appears pale,  but in no acute distress. She is oriented to person and place.   VITAL SIGNS: Her temperature is 98.3, pulse 66, blood pressure  114/59, respiratory rate 16, SaO2 100% on 2 liters. HEENT: Oropharynx is dry. No lesions. Sclerae anicteric, conjunctivae  pink. NECK: Supple. No thyromegaly or lymphadenopathy. LUNGS: Clear bilaterally. No rales, rhonchi, or wheezes. CARDIAC: Regular rate and rhythm. No murmur, rub, or gallop. ABDOMEN: Soft, mild distention. Colostomy in place, soft stool in  bag. Diminished bowel sounds. Nontender. No ascites noted. LOWER EXTREMITIES: Left leg is shortened and externally rotated. Tenderness over the lateral hip region. Distal pulses are palpable. No  edema of the lower extremities. Distal feet are warm. LABORATORY DATA/IMAGING: Sodium 122, creatinine 2.06,  potassium is 5. GFR is 23, troponin is less than 0.02. She had a left hip x-ray that showed the fracture, but it is not fully  reported by the radiologist, and there was also a knee x-ray done. I do  not interpret knee x-rays, but from what the ER reported there was no  notable fracture there. Her white count is 22.7, hemoglobin and hematocrit is 9.8 and 27.6,  and her platelet count is 540. She had neutrophils of 83. It looks like  her white blood cell count elevation is consistent with her chronic  numbers, likely due to her myelodysplasia. Again, her hemoglobin is  9.8. She might need pre-surgery transfusion due to her hematologic  illness. EKG was performed this morning, it shows sinus bradycardia with first-  degree AV block. No chest x-ray was done, but will need to go ahead and get that prior  to surgery also. ASSESSMENT  1. Left hip fracture. 2. History of Crohn's disease. 3. Hyponatremia. 4. Chronic kidney disease stage 3.  5. Mild dementia. 6. Myelodysplasia. PLAN: We are going to give her IV normal saline at 100 mL an hour. There was no evidence for urinary tract infection. She has morphine as  needed for severe pain.  We will give her a very small dose of Valium  as needed for severe spasm. Will go ahead and place her on oxygen. We need to repeat her metabolic profile every 4 hours to ensure that  her sodium is rising at an appropriate rate, no more than a certain  amount in 0-93 hours. I am going to consult [address] in  regard to her myelodysplasia and if she would require pre-surgery  transfusion, and keep her n.p.o. for now in case she is improved  enough to proceed to surgery this evening for correction of her hip  fracture. So, will obtain a chest x-ray. She has a Jenkins catheter already  in place. No anticoagulants or blood thinners for now. I have reconciled  her home medications. I have reviewed that with her . I  have reviewed her recent labs, which showed on 05/02/2017 she had  a sodium of 130 and a creatinine of 1.79, so that may be some acute  on chronic kidney disease there as well. Hopefully that will improve  with hydration and will make sure we get Aricept back on her list as I  had taken that off prior  it was actually listed as a generic  medication, but I reviewed that. So, in summary, this lady will need to proceed to surgery for correction  of her left hip fracture as she was prior to this functional and  ambulatory, however, her metabolic issues and hematologic  disease requires some further evaluation prior to optimization of her  medically being able to proceed to the operating room, so following her  creatinine level as well as her sodium over the next 4-12 hours closely  and with a consult to her hematologist and consult with Orthopedic  Surgery as well, and hydration in the interim.         MD PADMA Shi / Ada.Sherry  D:  06/21/2017   06:14  T:  06/21/2017   07:15  Job #:  [id number]

## 2017-06-21 NOTE — PROGRESS NOTES
Phone: [phone]  Paging : [phone]      Hematology / Oncology Progress Note    Admit Date: 6/21/2017    Assessment:     Hem/Onc Issues:  CMML, on single agent hydrea for count control  Reasons for Admission: Left hip pain post fall,  fracture  Other Active Issues:    ·     Principal Problem:    Closed left hip fracture (HCC) (6/21/2017)    Active Problems:    Myelodysplasia (myelodysplastic syndrome) (HCC) ()      Crohn's disease (HCC) ()      Hypertension ()      CKD (chronic kidney disease) stage 3, GFR 30-59 ml/min ()      Overview: stage 3   from stone      Hyponatremia (6/21/2017)      Dementia (6/21/2017)        Plan:     · No oncologic interventions. Her counts are good - no need for transfusions. · Likely surgery per Surgery team  · Other supportive cares per Medicine team  · We will follow as needed      Subjectives:     Left hip pain    Objectives:      VITAL SIGNS: Patient Vitals for the past 24 hrs:   BP Temp Pulse Resp SpO2 Height Weight   06/21/17 0500 114/59 98.3 °F (36.8 °C) 66 16 100 % - -   06/21/17 0430 101/56 97.4 °F (36.3 °C) 65 15 100 % - -   06/21/17 0415 101/56 - 65 15 100 % - -   06/21/17 0400 101/57 - - - - - -   06/21/17 0115 101/50 - 60 21 100 % - -   06/21/17 0100 (!) 89/48 - (!) 59 16 96 % - -   06/21/17 0040 117/60 - (!) 58 16 96 % 4' 11\" (1.499 m) 47.6 kg (105 lb)     GENERAL: normal appearance, no acute distress. HEENT: conjunctivae normal, eyelids normal, PERRLA, anicteric, external ears and nose normal, hearing grossly normal.   NECK: supple, no masses. LUNG: breathing comfortably, lungs clear to auscultation and percussion. CARDIOVASCULAR: normal heart sounds, heart rhythm regular, no peripheral edema. ABDOMEN: soft. bowel sounds normal, non-tender non distension, no hepatosplenomegaly   PELVIS: pelvic exam deferred. RECTUM: rectal exam deferred.    LYMPH NODES: no cervical adenopathy, no axillary adenopathy, no supraclavicular adenopathy, no infraclavicular adenopathy. SKIN: no rash, no petechiae, no ecchymosis, no pallor, no cutaneous nodules. MUSCULOSKELETAL: normal muscle strength. NEUROLOGIC: no focal motor deficit, normal gait, no abnormal mental status. PSYCHIATRIC: oriented to person, time and place, mood and affect appropriate. Medications:      Current Medications:    Current Facility-Administered Medications   Medication Dose Route Frequency    0.9% sodium chloride infusion  100 mL/hr IntraVENous CONTINUOUS    morphine injection 1 mg  1 mg IntraVENous Q4H PRN    acetaminophen (TYLENOL) tablet 650 mg  650 mg Oral Q6H PRN    ondansetron (ZOFRAN) injection 4 mg  4 mg IntraVENous Q6H PRN    diazePAM (VALIUM) tablet 2 mg  2 mg Oral Q6H PRN       Allergies:     Allergies   Allergen Reactions    Atorvastatin Hives    Levofloxacin Nausea and Vomiting    Losartan Drowsiness    Ramipril Other (comments)    Simvastatin Shortness of Breath    Statins-Hmg-Coa Reductase Inhibitors Other (comments)     Body aches         Ancillary Study Results:      Laboratory:    Recent Results (from the past 24 hour(s))   EKG, 12 LEAD, INITIAL    Collection Time: 06/21/17 12:42 AM   Result Value Ref Range    Ventricular Rate 59 BPM    Atrial Rate 59 BPM    P-R Interval 212 ms    QRS Duration 104 ms    Q-T Interval 448 ms    QTC Calculation (Bezet) 443 ms    Calculated P Axis 54 degrees    Calculated R Axis -52 degrees    Calculated T Axis 81 degrees    Diagnosis       Sinus bradycardia with 1st degree AV block  Left anterior fascicular block  Septal infarct (cited on or before 26-AUG-2013)  Abnormal ECG  When compared with ECG of 26-AUG-2013 09:48,  No significant change was found     PROTHROMBIN TIME + INR    Collection Time: 06/21/17  2:30 AM   Result Value Ref Range    Prothrombin time 14.4 11.5 - 15.2 sec    INR 1.2 0.8 - 1.2     PTT    Collection Time: 06/21/17  2:30 AM   Result Value Ref Range    aPTT 29.3 23.0 - 36.4 SEC   CBC WITH AUTOMATED DIFF    Collection Time: 06/21/17  2:30 AM   Result Value Ref Range    WBC 22.7 (H) 4.6 - 13.2 K/uL    RBC 2.71 (L) 4.20 - 5.30 M/uL    HGB 9.8 (L) 12.0 - 16.0 g/dL    HCT 27.6 (L) 35.0 - 45.0 %    .8 (H) 74.0 - 97.0 FL    MCH 36.2 (H) 24.0 - 34.0 PG    MCHC 35.5 31.0 - 37.0 g/dL    RDW 12.4 11.6 - 14.5 %    PLATELET 659 198 - 765 K/uL    MPV 11.1 9.2 - 11.8 FL    NEUTROPHILS 83 (H) 42 - 75 %    BAND NEUTROPHILS 6 (H) 0 - 5 %    LYMPHOCYTES 4 (L) 20 - 51 %    MONOCYTES 7 2 - 9 %    EOSINOPHILS 0 0 - 5 %    BASOPHILS 0 0 - 3 %    ABS. NEUTROPHILS 18.8 (H) 1.8 - 8.0 K/UL    ABS. LYMPHOCYTES 0.9 0.8 - 3.5 K/UL    ABS. MONOCYTES 1.6 (H) 0 - 1.0 K/UL    ABS. EOSINOPHILS 0.0 0.0 - 0.4 K/UL    ABS. BASOPHILS 0.0 0.0 - 0.1 K/UL    RBC COMMENTS MACROCYTOSIS  1+        RBC COMMENTS POIKILOCYTOSIS  SLIGHT        WBC COMMENTS VACUOLATED POLYS      DF MANUAL     CARDIAC PANEL,(CK, CKMB & TROPONIN)    Collection Time: 06/21/17  2:30 AM   Result Value Ref Range    CK 35 26 - 192 U/L    CK - MB <1.0 <3.6 ng/ml    CK-MB Index Cannot be calulated 0.0 - 4.0 %    Troponin-I, Qt. <0.02 0.00 - 1.54 NG/ML   METABOLIC PANEL, COMPREHENSIVE    Collection Time: 06/21/17  2:30 AM   Result Value Ref Range    Sodium 122 (L) 136 - 145 mmol/L    Potassium 5.0 3.5 - 5.5 mmol/L    Chloride 92 (L) 100 - 108 mmol/L    CO2 13 (L) 21 - 32 mmol/L    Anion gap 17 3.0 - 18 mmol/L    Glucose 82 74 - 99 mg/dL    BUN 25 (H) 7.0 - 18 MG/DL    Creatinine 2.06 (H) 0.6 - 1.3 MG/DL    BUN/Creatinine ratio 12 12 - 20      GFR est AA 28 (L) >60 ml/min/1.73m2    GFR est non-AA 23 (L) >60 ml/min/1.73m2    Calcium 8.3 (L) 8.5 - 10.1 MG/DL    Bilirubin, total 0.3 0.2 - 1.0 MG/DL    ALT (SGPT) 28 13 - 56 U/L    AST (SGOT) 46 (H) 15 - 37 U/L    Alk.  phosphatase 42 (L) 45 - 117 U/L    Protein, total 6.1 (L) 6.4 - 8.2 g/dL    Albumin 3.6 3.4 - 5.0 g/dL    Globulin 2.5 2.0 - 4.0 g/dL    A-G Ratio 1.4 0.8 - 1.7     TYPE & SCREEN    Collection Time: 06/21/17  2:30 AM Result Value Ref Range    Crossmatch Expiration 06/24/2017     ABO/Rh(D) O POSITIVE     Antibody screen NEG    URINALYSIS W/ RFLX MICROSCOPIC    Collection Time: 06/21/17  3:53 AM   Result Value Ref Range    Color YELLOW      Appearance CLEAR      Specific gravity 1.009 1.005 - 1.030      pH (UA) 5.0 5.0 - 8.0      Protein NEGATIVE  NEG mg/dL    Glucose NEGATIVE  NEG mg/dL    Ketone NEGATIVE  NEG mg/dL    Bilirubin NEGATIVE  NEG      Blood NEGATIVE  NEG      Urobilinogen 0.2 0.2 - 1.0 EU/dL    Nitrites NEGATIVE  NEG      Leukocyte Esterase NEGATIVE  NEG           Radiology:   No results found. Antony Smith.  Lois Bishop MD, PhD, [address]  Phone: [phone]  Pager: [phone]

## 2017-06-21 NOTE — ED PROVIDER NOTES
[address]  EMERGENCY DEPARTMENT HISTORY AND PHYSICAL EXAM       Date: 6/21/2017   Patient Name: Fredy Mayen   YOB: 1940  Medical Record Number: [id number]    History of Presenting Illness     Chief Complaint   Patient presents with    Syncope    Hip Pain        History Provided By:  Patient &     Additional History:   12:43 AM    Fredy Mayen is a 64 y.o. female with pertinent PMHx of HTN, CKD and dementia presenting via EMS to the ED c/o left hip and thigh pain s/p fall as she was getting up from the toilet ~1 hour PTA. Pt states that she lost her balance. Pt's  denies any hx of blood thinner use. Pt states that the pt's last tetanus vaccination was within the last 10 years. EMS gave the pt Fentanyl en route to the ED, for her pain. Pt specifically denies any vomiting. PCP: Edinson Brown MD  Social Hx: - tobacco use, + alcohol use, - illicit drug use    There are no other complaints, changes, or physical findings at this time.       Past History     Past Medical History:   Past Medical History:   Diagnosis Date    Autoimmune disease ([address]     Chron's    Calculus of kidney     Chronic kidney disease     stage 3   from stone    Chronic kidney disease     Crohn's disease ([address]     Epiretinal membrane     Glaucoma     Glaucoma     Gout     Hearing loss     Hernia of abdominal cavity     History of esophagogastroduodenoscopy (EGD)     Hypertension 2000    Ileostomy present ([address]     Memory problem     Menopause     Myelodysplasia (myelodysplastic syndrome) ([address]     Osteopenia         Past Surgical History:   Past Surgical History:   Procedure Laterality Date    HX ABDOMINAL LAPAROSCOPY      HX APPENDECTOMY      HX BREAST BIOPSY      HX CATARACT REMOVAL      HX COLECTOMY      HX GYN      pelvic cysts    HX OTHER SURGICAL      ilieostomy    HX OTHER SURGICAL      bone biopsy    HX SHOULDER ARTHROSCOPY      right    HX TONSILLECTOMY Family History:   Family History   Problem Relation Age of Onset    Malignant Hyperthermia Neg Hx     Pseudocholinesterase Deficiency Neg Hx     Delayed Awakening Neg Hx     Post-op Nausea/Vomiting Neg Hx     Emergence Delirium Neg Hx     Post-op Cognitive Dysfunction Neg Hx     Other Neg Hx         Social History:   Social History   Substance Use Topics    Smoking status: Former Smoker     Quit date: 1/1/1980    Smokeless tobacco: Never Used    Alcohol use 7.0 oz/week     7 Glasses of wine, 7 Shots of liquor per week        Allergies: Allergies   Allergen Reactions    Atorvastatin Hives    Levofloxacin Nausea and Vomiting    Losartan Drowsiness    Ramipril Other (comments)    Simvastatin Shortness of Breath    Statins-Hmg-Coa Reductase Inhibitors Other (comments)     Body aches        Review of Systems   Review of Systems   Gastrointestinal: Negative for vomiting. Musculoskeletal: Positive for arthralgias (left hip) and myalgias (left upper leg). All other systems reviewed and are negative. Physical Exam  Vitals:    06/21/17 0040 06/21/17 0100 06/21/17 0115   BP: 117/60 (!) 89/48 101/50   Pulse: (!) 58 (!) 59 60   Resp: 16 16 21   SpO2: 96% 96% 100%   Weight: 47.6 kg (105 lb)     Height: 4' 11\" (1.499 m)         Physical Exam   Nursing note and vitals reviewed. Constitutional: elderly and frail, no acute distress  Head: Normocephalic, Atraumatic  Eyes: Pupils are equal, round, and reactive to light, EOMI  Neck: Supple, non-tender  Cardiovascular: Regular rate and rhythm, 2/6 murmur  Chest: Normal work of breathing and chest excursion bilaterally  Lungs: Clear to ausculation bilaterally  Abdomen: Soft, non tender, non distended,colostomy bag in place  Back: No evidence of trauma or deformity  Extremities: TTP to the left hip, to the left knee; no LE edema  Skin: Abrasion to the left elbow and left knee.    Neuro: Alert and appropriate, NVI distal to injury  Psychiatric: Normal mood and affect    Diagnostic Study Results     Labs -      Recent Results (from the past 12 hour(s))   EKG, 12 LEAD, INITIAL    Collection Time: 06/21/17 12:42 AM   Result Value Ref Range    Ventricular Rate 59 BPM    Atrial Rate 59 BPM    P-R Interval 212 ms    QRS Duration 104 ms    Q-T Interval 448 ms    QTC Calculation (Bezet) 443 ms    Calculated P Axis 54 degrees    Calculated R Axis -52 degrees    Calculated T Axis 81 degrees    Diagnosis       Sinus bradycardia with 1st degree AV block  Left anterior fascicular block  Septal infarct (cited on or before 26-AUG-2013)  Abnormal ECG  When compared with ECG of 26-AUG-2013 09:48,  No significant change was found     PROTHROMBIN TIME + INR    Collection Time: 06/21/17  2:30 AM   Result Value Ref Range    Prothrombin time 14.4 11.5 - 15.2 sec    INR 1.2 0.8 - 1.2     PTT    Collection Time: 06/21/17  2:30 AM   Result Value Ref Range    aPTT 29.3 23.0 - 36.4 SEC   CBC WITH AUTOMATED DIFF    Collection Time: 06/21/17  2:30 AM   Result Value Ref Range    WBC 22.7 (H) 4.6 - 13.2 K/uL    RBC 2.71 (L) 4.20 - 5.30 M/uL    HGB 9.8 (L) 12.0 - 16.0 g/dL    HCT 27.6 (L) 35.0 - 45.0 %    .8 (H) 74.0 - 97.0 FL    MCH 36.2 (H) 24.0 - 34.0 PG    MCHC 35.5 31.0 - 37.0 g/dL    RDW 12.4 11.6 - 14.5 %    PLATELET 633 463 - 476 K/uL    MPV 11.1 9.2 - 11.8 FL    NEUTROPHILS 83 (H) 42 - 75 %    BAND NEUTROPHILS 6 (H) 0 - 5 %    LYMPHOCYTES 4 (L) 20 - 51 %    MONOCYTES 7 2 - 9 %    EOSINOPHILS 0 0 - 5 %    BASOPHILS 0 0 - 3 %    ABS. NEUTROPHILS 18.8 (H) 1.8 - 8.0 K/UL    ABS. LYMPHOCYTES 0.9 0.8 - 3.5 K/UL    ABS. MONOCYTES 1.6 (H) 0 - 1.0 K/UL    ABS. EOSINOPHILS 0.0 0.0 - 0.4 K/UL    ABS.  BASOPHILS 0.0 0.0 - 0.1 K/UL    RBC COMMENTS MACROCYTOSIS  1+        RBC COMMENTS POIKILOCYTOSIS  SLIGHT        WBC COMMENTS VACUOLATED POLYS      DF MANUAL     CARDIAC PANEL,(CK, CKMB & TROPONIN)    Collection Time: 06/21/17  2:30 AM   Result Value Ref Range    CK 35 26 - 192 U/L    CK - MB <1.0 <3.6 ng/ml    CK-MB Index Cannot be calulated 0.0 - 4.0 %    Troponin-I, Qt. <0.02 0.00 - 6.59 NG/ML   METABOLIC PANEL, COMPREHENSIVE    Collection Time: 06/21/17  2:30 AM   Result Value Ref Range    Sodium 122 (L) 136 - 145 mmol/L    Potassium 5.0 3.5 - 5.5 mmol/L    Chloride 92 (L) 100 - 108 mmol/L    CO2 13 (L) 21 - 32 mmol/L    Anion gap 17 3.0 - 18 mmol/L    Glucose 82 74 - 99 mg/dL    BUN 25 (H) 7.0 - 18 MG/DL    Creatinine 2.06 (H) 0.6 - 1.3 MG/DL    BUN/Creatinine ratio 12 12 - 20      GFR est AA 28 (L) >60 ml/min/1.73m2    GFR est non-AA 23 (L) >60 ml/min/1.73m2    Calcium 8.3 (L) 8.5 - 10.1 MG/DL    Bilirubin, total 0.3 0.2 - 1.0 MG/DL    ALT (SGPT) 28 13 - 56 U/L    AST (SGOT) 46 (H) 15 - 37 U/L    Alk. phosphatase 42 (L) 45 - 117 U/L    Protein, total 6.1 (L) 6.4 - 8.2 g/dL    Albumin 3.6 3.4 - 5.0 g/dL    Globulin 2.5 2.0 - 4.0 g/dL    A-G Ratio 1.4 0.8 - 1.7     TYPE & SCREEN    Collection Time: 06/21/17  2:30 AM   Result Value Ref Range    Crossmatch Expiration 06/24/2017     ABO/Rh(D) O POSITIVE     Antibody screen NEG        Radiologic Studies -  The following have been ordered and reviewed:  XR HIP LT W OR WO PELV 2-3 VWS    (Results Pending)   XR KNEE LT 3 V    (Results Pending)     0235  RADIOLOGY FINDINGS  Left hip X-ray shows  intertrochanteric fracture, which is displaced and impacted. Pending review by Radiologist  Recorded by MAYNOR Weinberg, as dictated by Dudley Pulido MD.    1285  RADIOLOGY FINDINGS  Left knee X-ray shows no acute abnormalities  Pending review by Radiologist  Recorded by MAYNOR Weinberg, as dictated by Dudley Pulido MD.      Medical Decision Making   I am the first provider for this patient. I reviewed the vital signs, available nursing notes, past medical history, past surgical history, family history and social history. Vital Signs-Reviewed the patient's vital signs.    Patient Vitals for the past 12 hrs:   Pulse Resp BP SpO2   06/21/17 0115 60 21 101/50 100 %   06/21/17 0100 (!) 59 16 (!) 89/48 96 %   06/21/17 0040 (!) 58 16 117/60 96 %       Pulse Oximetry Analysis - Normal 96% on RA    Cardiac Monitor:   Rate: 58 bpm  Rhythm: sinus bradycardia     EKG interpretation: (Preliminary)  Rhythm: sinus bradycardia with 1st degree AV block. Rate (approx.): 59 bpm; QRS is 104 ms. EKG read by Marty Rodriguez MD at [address] [facility] Records: Old medical records. Previous electrocardiograms. Nursing notes. Ambulance run sheet. Provider Notes: 64year old female, with multilevel comorbidities s/p mechanical fall resulting in left hip fracture. Discussed with orthopaedics and hospitalist for further management. Procedures:   Procedures    ED Course:  12:43 AM  Initial assessment performed. The patients presenting problems have been discussed, and they are in agreement with the care plan formulated and outlined with them. I have encouraged them to ask questions as they arise throughout their visit. 2:41 AM - Discussed pts results with her and her . Consult Note:  2:44 AM  Marty Rodriguez MD spoke with Patrick Bañuelos DO,  Specialty: Ortho  Discussed pt's hx, disposition, and available diagnostic and imaging results. Reviewed care plans. Consultant agrees with plans as outlined. Dr. Brittney Umana states that he will see the pt in the morning and advises admitting the pt to the hospitalist.  Written by Ti Cosme. Helena Topete, ED Scribe, as dictated by Marty Rodriguez MD.     CONSULT NOTE:   2:50 AM  Marty Rodriguez MD spoke with Salazar Wagner MD,   Specialty: Hospitalist  Discussed pt's hx, disposition, and available diagnostic and imaging results. Reviewed care plans. Consultant will admit the pt to MEDICAL.   Written by Liliam Nguyen, ED Scribe, as dictated by Marty Rodriguez MD.      Medications Given in the ED:  Medications   morphine injection 2 mg (2 mg IntraVENous Given 6/21/17 0400)   sodium chloride 0.9 % bolus infusion 1,000 mL (0 mL IntraVENous IV Completed 6/21/17 0230)       2:51 AM - Patient is being admitted to the hospital by Jaciel Diallo MD. The results of their tests and reasons for their admission have been discussed with them and/or available family. They convey agreement and understanding for the need to be admitted and for their admission diagnosis. CONDITIONS ON ADMISSION:  Deep Vein Thrombosis is not present at the time of admission. Thrombosis is not present at the time of admission. Urinary Tract Infection is not present at the time of admission. Pneumonia is not present at the time of admission. MRSA is not present at the time of admission. Wound infection is not present at the time of admission. Pressure Ulcer is not present at the time of admission. Diagnosis   Clinical Impression:   1. Fall, initial encounter    2. Acute hip pain, left    3. Closed left hip fracture, initial encounter ([facility]       PLAN: ADMIT TO MEDICAL  _______________________________   Attestation: This note is prepared by Belem Michaud, acting as Scribe for Nader Simental MD; at 12:43 AM on 6/21/2017. Nader Simental MD: The scribe's documentation has been prepared under my direction and personally reviewed by me in its entirety.  I confirm that the note above accurately reflects all work, treatment, procedures, and medical decision making performed by me.     _______________________________

## 2017-06-22 ENCOUNTER — APPOINTMENT (OUTPATIENT)
Dept: GENERAL RADIOLOGY | Age: 77
DRG: 481 | End: 2017-06-22
Attending: ORTHOPAEDIC SURGERY
Payer: MEDICARE

## 2017-06-22 ENCOUNTER — ANESTHESIA (OUTPATIENT)
Dept: SURGERY | Age: 77
DRG: 481 | End: 2017-06-22
Payer: MEDICARE

## 2017-06-22 PROBLEM — D64.9 ANEMIA: Status: ACTIVE | Noted: 2017-06-22

## 2017-06-22 LAB
ANION GAP BLD CALC-SCNC: 11 MMOL/L (ref 3–18)
ANION GAP BLD CALC-SCNC: 14 MMOL/L (ref 3–18)
BUN BLD-MCNC: 14 MG/DL (ref 7–18)
BUN SERPL-MCNC: 16 MG/DL (ref 7–18)
BUN SERPL-MCNC: 20 MG/DL (ref 7–18)
BUN/CREAT SERPL: 12 (ref 12–20)
BUN/CREAT SERPL: 14 (ref 12–20)
CALCIUM SERPL-MCNC: 7.7 MG/DL (ref 8.5–10.1)
CALCIUM SERPL-MCNC: 8.1 MG/DL (ref 8.5–10.1)
CHLORIDE BLD-SCNC: 101 MMOL/L (ref 100–108)
CHLORIDE SERPL-SCNC: 100 MMOL/L (ref 100–108)
CHLORIDE SERPL-SCNC: 101 MMOL/L (ref 100–108)
CO2 SERPL-SCNC: 18 MMOL/L (ref 21–32)
CO2 SERPL-SCNC: 19 MMOL/L (ref 21–32)
CORTIS AM PEAK SERPL-MCNC: 27.3 UG/DL (ref 4.3–22.4)
CREAT SERPL-MCNC: 1.32 MG/DL (ref 0.6–1.3)
CREAT SERPL-MCNC: 1.43 MG/DL (ref 0.6–1.3)
ERYTHROCYTE [DISTWIDTH] IN BLOOD BY AUTOMATED COUNT: 12.6 % (ref 11.6–14.5)
GLUCOSE BLD STRIP.AUTO-MCNC: 155 MG/DL (ref 74–106)
GLUCOSE SERPL-MCNC: 130 MG/DL (ref 74–99)
GLUCOSE SERPL-MCNC: 138 MG/DL (ref 74–99)
HCT VFR BLD AUTO: 19.4 % (ref 35–45)
HCT VFR BLD AUTO: 30.9 % (ref 35–45)
HCT VFR BLD CALC: 26 % (ref 36–49)
HGB BLD-MCNC: 10.8 G/DL (ref 12–16)
HGB BLD-MCNC: 6.9 G/DL (ref 12–16)
HGB BLD-MCNC: 8.8 G/DL (ref 12–16)
MCH RBC QN AUTO: 36.5 PG (ref 24–34)
MCHC RBC AUTO-ENTMCNC: 35.6 G/DL (ref 31–37)
MCV RBC AUTO: 102.6 FL (ref 74–97)
PLATELET # BLD AUTO: 83 K/UL (ref 135–420)
PMV BLD AUTO: 10.5 FL (ref 9.2–11.8)
POTASSIUM BLD-SCNC: 4 MMOL/L (ref 3.5–5.5)
POTASSIUM SERPL-SCNC: 4 MMOL/L (ref 3.5–5.5)
POTASSIUM SERPL-SCNC: 4.9 MMOL/L (ref 3.5–5.5)
RBC # BLD AUTO: 1.89 M/UL (ref 4.2–5.3)
SODIUM BLD-SCNC: 133 MMOL/L (ref 136–145)
SODIUM SERPL-SCNC: 130 MMOL/L (ref 136–145)
SODIUM SERPL-SCNC: 133 MMOL/L (ref 136–145)
TSH SERPL DL<=0.05 MIU/L-ACNC: 0.6 UIU/ML (ref 0.36–3.74)
WBC # BLD AUTO: 19.4 K/UL (ref 4.6–13.2)

## 2017-06-22 PROCEDURE — 77030011256 HC DRSG MEPILEX <16IN NO BORD MOLN -A: Performed by: ORTHOPAEDIC SURGERY

## 2017-06-22 PROCEDURE — 74011000272 HC RX REV CODE- 272: Performed by: ORTHOPAEDIC SURGERY

## 2017-06-22 PROCEDURE — 76210000000 HC OR PH I REC 2 TO 2.5 HR: Performed by: ORTHOPAEDIC SURGERY

## 2017-06-22 PROCEDURE — C1713 ANCHOR/SCREW BN/BN,TIS/BN: HCPCS | Performed by: ORTHOPAEDIC SURGERY

## 2017-06-22 PROCEDURE — C1769 GUIDE WIRE: HCPCS | Performed by: ORTHOPAEDIC SURGERY

## 2017-06-22 PROCEDURE — 77030014144 HC TY SPN ANES BBMI -B: Performed by: ANESTHESIOLOGY

## 2017-06-22 PROCEDURE — 74011250637 HC RX REV CODE- 250/637: Performed by: HOSPITALIST

## 2017-06-22 PROCEDURE — 77030013079 HC BLNKT BAIR HGGR 3M -A: Performed by: ANESTHESIOLOGY

## 2017-06-22 PROCEDURE — 85018 HEMOGLOBIN: CPT | Performed by: HOSPITALIST

## 2017-06-22 PROCEDURE — 74011250636 HC RX REV CODE- 250/636

## 2017-06-22 PROCEDURE — 65660000000 HC RM CCU STEPDOWN

## 2017-06-22 PROCEDURE — 80048 BASIC METABOLIC PNL TOTAL CA: CPT | Performed by: HOSPITALIST

## 2017-06-22 PROCEDURE — 36430 TRANSFUSION BLD/BLD COMPNT: CPT

## 2017-06-22 PROCEDURE — 77030020268 HC MISC GENERAL SUPPLY: Performed by: ORTHOPAEDIC SURGERY

## 2017-06-22 PROCEDURE — 77010033678 HC OXYGEN DAILY

## 2017-06-22 PROCEDURE — 74011250636 HC RX REV CODE- 250/636: Performed by: INTERNAL MEDICINE

## 2017-06-22 PROCEDURE — 77030018836 HC SOL IRR NACL ICUM -A: Performed by: ORTHOPAEDIC SURGERY

## 2017-06-22 PROCEDURE — 82947 ASSAY GLUCOSE BLOOD QUANT: CPT

## 2017-06-22 PROCEDURE — 30233N1 TRANSFUSION OF NONAUTOLOGOUS RED BLOOD CELLS INTO PERIPHERAL VEIN, PERCUTANEOUS APPROACH: ICD-10-PCS | Performed by: INTERNAL MEDICINE

## 2017-06-22 PROCEDURE — 76010000153 HC OR TIME 1.5 TO 2 HR: Performed by: ORTHOPAEDIC SURGERY

## 2017-06-22 PROCEDURE — 82533 TOTAL CORTISOL: CPT | Performed by: HOSPITALIST

## 2017-06-22 PROCEDURE — 74011000250 HC RX REV CODE- 250: Performed by: ORTHOPAEDIC SURGERY

## 2017-06-22 PROCEDURE — 77030031139 HC SUT VCRL2 J&J -A: Performed by: ORTHOPAEDIC SURGERY

## 2017-06-22 PROCEDURE — 0QH736Z INSERTION OF INTRAMEDULLARY INTERNAL FIXATION DEVICE INTO LEFT UPPER FEMUR, PERCUTANEOUS APPROACH: ICD-10-PCS | Performed by: ORTHOPAEDIC SURGERY

## 2017-06-22 PROCEDURE — 73552 X-RAY EXAM OF FEMUR 2/>: CPT

## 2017-06-22 PROCEDURE — 36415 COLL VENOUS BLD VENIPUNCTURE: CPT | Performed by: HOSPITALIST

## 2017-06-22 PROCEDURE — 74011000250 HC RX REV CODE- 250

## 2017-06-22 PROCEDURE — 85027 COMPLETE CBC AUTOMATED: CPT | Performed by: HOSPITALIST

## 2017-06-22 PROCEDURE — 76060000034 HC ANESTHESIA 1.5 TO 2 HR: Performed by: ORTHOPAEDIC SURGERY

## 2017-06-22 PROCEDURE — 77030021107 HC SHFT RMR MOD DISP STRY -D: Performed by: ORTHOPAEDIC SURGERY

## 2017-06-22 PROCEDURE — 74011250636 HC RX REV CODE- 250/636: Performed by: HOSPITALIST

## 2017-06-22 PROCEDURE — 77030011640 HC PAD GRND REM COVD -A: Performed by: ORTHOPAEDIC SURGERY

## 2017-06-22 PROCEDURE — 74011250636 HC RX REV CODE- 250/636: Performed by: ANESTHESIOLOGY

## 2017-06-22 PROCEDURE — 77030034479 HC ADH SKN CLSR PRINEO J&J -B: Performed by: ORTHOPAEDIC SURGERY

## 2017-06-22 PROCEDURE — P9016 RBC LEUKOCYTES REDUCED: HCPCS | Performed by: EMERGENCY MEDICINE

## 2017-06-22 PROCEDURE — 77030037875 HC DRSG MEPILEX <16IN BORD MOLN -A: Performed by: ORTHOPAEDIC SURGERY

## 2017-06-22 PROCEDURE — 74011000258 HC RX REV CODE- 258

## 2017-06-22 PROCEDURE — 74011250636 HC RX REV CODE- 250/636: Performed by: ORTHOPAEDIC SURGERY

## 2017-06-22 DEVICE — LOCKING SCREW, FULLY THREADED: Type: IMPLANTABLE DEVICE | Site: HIP | Status: FUNCTIONAL

## 2017-06-22 DEVICE — LONG NAIL KIT R1.5, TI, LEFT
Type: IMPLANTABLE DEVICE | Site: HIP | Status: FUNCTIONAL
Brand: GAMMA

## 2017-06-22 DEVICE — LAG SCREW, TI
Type: IMPLANTABLE DEVICE | Site: HIP | Status: FUNCTIONAL
Brand: GAMMA

## 2017-06-22 RX ORDER — SODIUM CHLORIDE 0.9 % (FLUSH) 0.9 %
5-10 SYRINGE (ML) INJECTION AS NEEDED
Status: DISCONTINUED | OUTPATIENT
Start: 2017-06-22 | End: 2017-06-26 | Stop reason: HOSPADM

## 2017-06-22 RX ORDER — KETAMINE HYDROCHLORIDE 10 MG/ML
INJECTION, SOLUTION INTRAMUSCULAR; INTRAVENOUS AS NEEDED
Status: DISCONTINUED | OUTPATIENT
Start: 2017-06-22 | End: 2017-06-22 | Stop reason: HOSPADM

## 2017-06-22 RX ORDER — SODIUM CHLORIDE 9 MG/ML
250 INJECTION, SOLUTION INTRAVENOUS AS NEEDED
Status: DISCONTINUED | OUTPATIENT
Start: 2017-06-22 | End: 2017-06-25 | Stop reason: SDUPTHER

## 2017-06-22 RX ORDER — MIDAZOLAM HYDROCHLORIDE 1 MG/ML
INJECTION, SOLUTION INTRAMUSCULAR; INTRAVENOUS AS NEEDED
Status: DISCONTINUED | OUTPATIENT
Start: 2017-06-22 | End: 2017-06-22 | Stop reason: HOSPADM

## 2017-06-22 RX ORDER — HYDROMORPHONE HYDROCHLORIDE 1 MG/ML
0.5 INJECTION, SOLUTION INTRAMUSCULAR; INTRAVENOUS; SUBCUTANEOUS
Status: DISCONTINUED | OUTPATIENT
Start: 2017-06-22 | End: 2017-06-22

## 2017-06-22 RX ORDER — BUPIVACAINE HYDROCHLORIDE 7.5 MG/ML
INJECTION, SOLUTION INTRASPINAL AS NEEDED
Status: DISCONTINUED | OUTPATIENT
Start: 2017-06-22 | End: 2017-06-22 | Stop reason: HOSPADM

## 2017-06-22 RX ORDER — MAGNESIUM SULFATE 100 %
4 CRYSTALS MISCELLANEOUS AS NEEDED
Status: DISCONTINUED | OUTPATIENT
Start: 2017-06-22 | End: 2017-06-22

## 2017-06-22 RX ORDER — SODIUM CHLORIDE 0.9 % (FLUSH) 0.9 %
5-10 SYRINGE (ML) INJECTION EVERY 8 HOURS
Status: DISCONTINUED | OUTPATIENT
Start: 2017-06-22 | End: 2017-06-26 | Stop reason: HOSPADM

## 2017-06-22 RX ORDER — ENOXAPARIN SODIUM 100 MG/ML
30 INJECTION SUBCUTANEOUS DAILY
Status: DISCONTINUED | OUTPATIENT
Start: 2017-06-23 | End: 2017-06-23

## 2017-06-22 RX ORDER — ACETAMINOPHEN 325 MG/1
650 TABLET ORAL
Status: DISCONTINUED | OUTPATIENT
Start: 2017-06-22 | End: 2017-06-26 | Stop reason: HOSPADM

## 2017-06-22 RX ORDER — SODIUM CHLORIDE, SODIUM LACTATE, POTASSIUM CHLORIDE, CALCIUM CHLORIDE 600; 310; 30; 20 MG/100ML; MG/100ML; MG/100ML; MG/100ML
INJECTION, SOLUTION INTRAVENOUS
Status: DISCONTINUED | OUTPATIENT
Start: 2017-06-22 | End: 2017-06-22

## 2017-06-22 RX ORDER — SODIUM CHLORIDE 0.9 % (FLUSH) 0.9 %
5-10 SYRINGE (ML) INJECTION AS NEEDED
Status: DISCONTINUED | OUTPATIENT
Start: 2017-06-22 | End: 2017-06-22

## 2017-06-22 RX ORDER — SODIUM CHLORIDE, SODIUM LACTATE, POTASSIUM CHLORIDE, CALCIUM CHLORIDE 600; 310; 30; 20 MG/100ML; MG/100ML; MG/100ML; MG/100ML
INJECTION, SOLUTION INTRAVENOUS
Status: DISCONTINUED | OUTPATIENT
Start: 2017-06-22 | End: 2017-06-22 | Stop reason: HOSPADM

## 2017-06-22 RX ORDER — LANOLIN ALCOHOL/MO/W.PET/CERES
1 CREAM (GRAM) TOPICAL
Status: DISCONTINUED | OUTPATIENT
Start: 2017-06-23 | End: 2017-06-26 | Stop reason: HOSPADM

## 2017-06-22 RX ORDER — LIDOCAINE HYDROCHLORIDE 10 MG/ML
INJECTION INFILTRATION; PERINEURAL AS NEEDED
Status: DISCONTINUED | OUTPATIENT
Start: 2017-06-22 | End: 2017-06-22 | Stop reason: HOSPADM

## 2017-06-22 RX ORDER — FLUMAZENIL 0.1 MG/ML
0.2 INJECTION INTRAVENOUS
Status: DISCONTINUED | OUTPATIENT
Start: 2017-06-22 | End: 2017-06-22

## 2017-06-22 RX ORDER — FUROSEMIDE 10 MG/ML
20 INJECTION INTRAMUSCULAR; INTRAVENOUS ONCE
Status: COMPLETED | OUTPATIENT
Start: 2017-06-22 | End: 2017-06-22

## 2017-06-22 RX ORDER — CEFAZOLIN SODIUM 2 G/50ML
2 SOLUTION INTRAVENOUS EVERY 8 HOURS
Status: COMPLETED | OUTPATIENT
Start: 2017-06-22 | End: 2017-06-23

## 2017-06-22 RX ORDER — FENTANYL CITRATE 50 UG/ML
50 INJECTION, SOLUTION INTRAMUSCULAR; INTRAVENOUS AS NEEDED
Status: DISCONTINUED | OUTPATIENT
Start: 2017-06-22 | End: 2017-06-22

## 2017-06-22 RX ORDER — NALOXONE HYDROCHLORIDE 0.4 MG/ML
0.1 INJECTION, SOLUTION INTRAMUSCULAR; INTRAVENOUS; SUBCUTANEOUS AS NEEDED
Status: DISCONTINUED | OUTPATIENT
Start: 2017-06-22 | End: 2017-06-22

## 2017-06-22 RX ORDER — DEXTROSE 50 % IN WATER (D50W) INTRAVENOUS SYRINGE
25-50 AS NEEDED
Status: DISCONTINUED | OUTPATIENT
Start: 2017-06-22 | End: 2017-06-22

## 2017-06-22 RX ORDER — ONDANSETRON 2 MG/ML
4 INJECTION INTRAMUSCULAR; INTRAVENOUS
Status: DISCONTINUED | OUTPATIENT
Start: 2017-06-22 | End: 2017-06-26 | Stop reason: HOSPADM

## 2017-06-22 RX ADMIN — BUPIVACAINE HYDROCHLORIDE 1.4 ML: 7.5 INJECTION, SOLUTION INTRASPINAL at 17:44

## 2017-06-22 RX ADMIN — LIDOCAINE HYDROCHLORIDE 2 ML: 10 INJECTION INFILTRATION; PERINEURAL at 17:44

## 2017-06-22 RX ADMIN — SODIUM CHLORIDE TAB 1 GM 1 G: 1 TAB at 09:00

## 2017-06-22 RX ADMIN — CEFAZOLIN SODIUM 2 G: 2 SOLUTION INTRAVENOUS at 17:29

## 2017-06-22 RX ADMIN — SODIUM CHLORIDE 100 ML/HR: 900 INJECTION, SOLUTION INTRAVENOUS at 21:29

## 2017-06-22 RX ADMIN — FENTANYL CITRATE 50 MCG: 50 INJECTION, SOLUTION INTRAMUSCULAR; INTRAVENOUS at 20:49

## 2017-06-22 RX ADMIN — LATANOPROST 1 DROP: 50 SOLUTION OPHTHALMIC at 22:45

## 2017-06-22 RX ADMIN — DONEPEZIL HYDROCHLORIDE 5 MG: 5 TABLET, FILM COATED ORAL at 22:45

## 2017-06-22 RX ADMIN — FENTANYL CITRATE 50 MCG: 50 INJECTION, SOLUTION INTRAMUSCULAR; INTRAVENOUS at 21:35

## 2017-06-22 RX ADMIN — DIAZEPAM 2 MG: 2 TABLET ORAL at 10:09

## 2017-06-22 RX ADMIN — MULTIPLE VITAMINS W/ MINERALS TAB 1 TABLET: TAB at 09:40

## 2017-06-22 RX ADMIN — FENTANYL CITRATE 50 MCG: 50 INJECTION, SOLUTION INTRAMUSCULAR; INTRAVENOUS at 20:29

## 2017-06-22 RX ADMIN — SODIUM CHLORIDE, SODIUM LACTATE, POTASSIUM CHLORIDE, CALCIUM CHLORIDE: 600; 310; 30; 20 INJECTION, SOLUTION INTRAVENOUS at 18:48

## 2017-06-22 RX ADMIN — KETAMINE HYDROCHLORIDE 20 MG: 10 INJECTION, SOLUTION INTRAMUSCULAR; INTRAVENOUS at 17:35

## 2017-06-22 RX ADMIN — SODIUM CHLORIDE, SODIUM LACTATE, POTASSIUM CHLORIDE, CALCIUM CHLORIDE: 600; 310; 30; 20 INJECTION, SOLUTION INTRAVENOUS at 17:29

## 2017-06-22 RX ADMIN — FAMOTIDINE 20 MG: 20 TABLET ORAL at 09:41

## 2017-06-22 RX ADMIN — MIDAZOLAM HYDROCHLORIDE 2 MG: 1 INJECTION, SOLUTION INTRAMUSCULAR; INTRAVENOUS at 17:35

## 2017-06-22 RX ADMIN — FAMOTIDINE 20 MG: 20 TABLET ORAL at 22:45

## 2017-06-22 RX ADMIN — FUROSEMIDE 20 MG: 10 INJECTION, SOLUTION INTRAMUSCULAR; INTRAVENOUS at 09:41

## 2017-06-22 NOTE — PROGRESS NOTES
Shift summary: Pt A&Ox4, bed rest.  at bedside- stated that a private sitter would be coming at 10:00. Left hip pain managed with IV Morphine. Appears to be resting comfortably. No visible signs of distress.      Patient Vitals for the past 8 hrs:   Temp Pulse Resp BP SpO2   06/21/17 2359 98.8 °F (37.1 °C) 83 18 116/54 99 %   06/21/17 1931 99.2 °F (37.3 °C) 66 16 121/52 99 %

## 2017-06-22 NOTE — PROGRESS NOTES
Noted plan for surgery today. Met with pt's ,  Anjum Griffin ([phone]; [phone]), and discussed plan of care needs. Offered FOC and pt has indicated he would like pt to go to [facility] at [facility] for inpt rehab. CMS has been notified. Please ensure PT/OT is ordered when appropriate to assist with SNF placement.

## 2017-06-22 NOTE — ANESTHESIA PROCEDURE NOTES
Spinal Block    Start time: 6/22/2017 5:44 PM  End time: 6/22/2017 5:47 PM  Performed by: Tuan Robb by: Skyler Stiles     Pre-procedure:   Indications: primary anesthetic  Preanesthetic Checklist: patient identified, risks and benefits discussed, anesthesia consent, site marked, patient being monitored and timeout performed    Timeout Time: 17:43          Spinal Block:   Patient Position:  Right lateral decubitus  Prep Region:  Lumbar  Prep: chlorhexidine      Location:  L3-4  Technique:  Single shot  Local:  Lidocaine 1%  Local Dose (mL):  2    Needle:   Needle Type:  Pencan  Needle Gauge:  25 G  Attempts:  1      Events: CSF confirmed, no blood with aspiration and no paresthesia        Assessment:  Insertion:  Uncomplicated  Patient tolerance:  Patient tolerated the procedure well with no immediate complications

## 2017-06-22 NOTE — PROGRESS NOTES
Shift summary: pt is on bed rest with regan in place for comfort - draining to clear yellow urine. Denied pain when asked. No untoward event for shift. Blood work done this am - Dr. Kira Monroy is aware. Pt has been NPO since midnight for scheduled surgery to L hip at 1600 today. Personal sitter by bedside all night. Colostomy intact with loose stool.

## 2017-06-22 NOTE — PROGRESS NOTES
Progress Note      Patient: Analisa Duff               Sex: female          DOA: 6/21/2017         YOB: 1940      Age:  64 y.o.        LOS:  LOS: 1 day               Subjective:     Analisa Duff is a 64 y.o. female who c/o LEFT HIP PAIN  PENDING CLEARENCE FOR FIXATION LEFT iT FRACTURE      Objective:      Visit Vitals    /62    Pulse 80    Temp 98.2 °F (36.8 °C)    Resp 16    Ht 4' 11\" (1.499 m)    Wt 47.3 kg (104 lb 4.8 oz)    SpO2 100%    BMI 21.07 kg/m2       Physical Exam:  aao X 3  LEFT LEG SHORT EXTERNAL ROTATED  PAIN AT LEG    Intake and Output:  Current Shift:     Last three shifts:  06/20 1901 - 06/22 0700  In: 2061.7 [I.V.:2061.7]  Out: 1440 [Urine:850]    Lab/Data Reviewed:  BMP:   Lab Results   Component Value Date/Time     (L) 06/22/2017 05:58 AM    K 4.9 06/22/2017 05:58 AM     06/22/2017 05:58 AM    CO2 19 (L) 06/22/2017 05:58 AM    AGAP 11 06/22/2017 05:58 AM     (H) 06/22/2017 05:58 AM    BUN 20 (H) 06/22/2017 05:58 AM    CREA 1.43 (H) 06/22/2017 05:58 AM    GFRAA 43 (L) 06/22/2017 05:58 AM    GFRNA 35 (L) 06/22/2017 05:58 AM     CMP:   Lab Results   Component Value Date/Time     (L) 06/22/2017 05:58 AM    K 4.9 06/22/2017 05:58 AM     06/22/2017 05:58 AM    CO2 19 (L) 06/22/2017 05:58 AM    AGAP 11 06/22/2017 05:58 AM     (H) 06/22/2017 05:58 AM    BUN 20 (H) 06/22/2017 05:58 AM    CREA 1.43 (H) 06/22/2017 05:58 AM    GFRAA 43 (L) 06/22/2017 05:58 AM    GFRNA 35 (L) 06/22/2017 05:58 AM    CA 7.7 (L) 06/22/2017 05:58 AM     CBC:   Lab Results   Component Value Date/Time    WBC 19.4 (H) 06/22/2017 06:51 AM    HGB 6.9 (L) 06/22/2017 06:51 AM    HCT 19.4 (L) 06/22/2017 06:51 AM    PLT 83 (L) 06/22/2017 06:51 AM       Medications Reviewed      Assessment/Plan     Principal Problem:    Closed left hip fracture ([facility] [address] (6/21/2017)    Active Problems:    Myelodysplasia (myelodysplastic syndrome) (HCC) ()      Crohn's disease (HCC) () Hypertension ()      CKD (chronic kidney disease) stage 3, GFR 30-59 ml/min ()      Overview: stage 3   from stone      Hyponatremia (6/21/2017)      Dementia (6/21/2017)        Plan for OR today  continue npo STATUS  MEDICAL NOTE NEEDED  tranfuse 2 unit this am prior to surgery this PM anticipated OR at 4 PM

## 2017-06-22 NOTE — BRIEF OP NOTE
BRIEF OPERATIVE NOTE    Date of Procedure: 6/22/2017   Preoperative Diagnosis: FRACTURED LEFT HIP  Postoperative Diagnosis: FRACTURED LEFT HIP    Procedure(s):  INTRAMEDULLARY NAILING LEFT HIP WITH C-ARM   Surgeon(s) and Role:     * Leanna Flores DO - Primary         Assistant Staff:       Surgical Staff:  Circ-1: Marcello Hong  Circ-2: Hernando Merino RN  Radiology Technician: Zahra Garcia  Scrub Tech-1: Mohinder Cain  Scrub Tech-2: Elaine Pod  Event Time In   Incision Start 1807   Incision Close 1900     Anesthesia: Spinal   Estimated Blood Loss: 300ml  ivf 1400 ml  Specimens: * No specimens in log *   Findings: unstable inter troch fx   Complications: none  Implants:   Implant Name Type Inv.  Item Serial No.  Lot No. LRB No. Used Action   NAIL GAMMA3 LT [id number] STRL --  - [id number]  NAIL GAMMA3 LT [id number] STRL --   [employer] [facility] [id number] Left 1 Implanted   SCR BNE LAG GAMMA 3 10.5X90MM -- TI STRL - [id number]  SCR BNE LAG GAMMA 3 10.5X90MM -- TI STRL  [employer] HOW [id number] Left 1 Implanted   SCR BNE LCK T2 FT 5X35MM TI -- STRL - [id number]   SCR BNE LCK T2 FT 5X35MM TI -- STRL   [employer] HOW [id number] Left 1 Implanted

## 2017-06-22 NOTE — H&P (VIEW-ONLY)
Consult Note    Patient: Jose Eduardo Jenkins               Sex: female          DOA: 6/21/2017         YOB: 1940      Age:  64 y.o.        LOS:  LOS: 0 days              HPI:     Jose Eduardo Jenkins is a 64 y.o. female who has been seen for left hip pain after witnessed fall at home  Patient transported to [facility] for evaluation  Noted left hip fracture  Complicated medical history admitted to medical service  Noted dehydration and hyponatremia  Pending clearance for OR    Past Medical History:   Diagnosis Date    Autoimmune disease ([address]     Chron's    Calculus of kidney     Chronic kidney disease     stage 3   from stone    Chronic kidney disease     Closed left hip fracture ([address] 6/21/2017    Crohn's disease ([address]     Epiretinal membrane     Glaucoma     Glaucoma     Gout     Hearing loss     Hernia of abdominal cavity     History of esophagogastroduodenoscopy (EGD)     Hypertension 2000    Ileostomy present ([address]     Memory problem     Menopause     Myelodysplasia (myelodysplastic syndrome) ([address]     Osteopenia        Past Surgical History:   Procedure Laterality Date    HX ABDOMINAL LAPAROSCOPY      HX APPENDECTOMY      HX BREAST BIOPSY      HX CATARACT REMOVAL      HX COLECTOMY      HX GYN      pelvic cysts    HX OTHER SURGICAL      ilieostomy    HX OTHER SURGICAL      bone biopsy    HX SHOULDER ARTHROSCOPY      right    HX TONSILLECTOMY         Family History   Problem Relation Age of Onset    Malignant Hyperthermia Neg Hx     Pseudocholinesterase Deficiency Neg Hx     Delayed Awakening Neg Hx     Post-op Nausea/Vomiting Neg Hx     Emergence Delirium Neg Hx     Post-op Cognitive Dysfunction Neg Hx     Other Neg Hx        Social History     Social History    Marital status:      Spouse name: N/A    Number of children: N/A    Years of education: N/A     Social History Main Topics    Smoking status: Former Smoker     Quit date: 1/1/1980    Smokeless tobacco: Never Used  Alcohol use 7.0 oz/week     7 Glasses of wine, 7 Shots of liquor per week    Drug use: No    Sexual activity: Not Currently     Partners: Male     Other Topics Concern    None     Social History Narrative       Prior to Admission medications    Medication Sig Start Date End Date Taking? Authorizing Provider   raNITIdine (ZANTAC) 150 mg tablet Take 150 mg by mouth two (2) times a day. Yes Jae Colon MD   ondansetron hcl (ZOFRAN, AS HYDROCHLORIDE,) 4 mg tablet Take 4 mg by mouth every twelve (12) hours as needed for Nausea. Yes Jae Colon MD   multivitamin (ONE A DAY) tablet Take 1 Tab by mouth daily. Indications: VITAMIN DEFICIENCY   Yes Historical Provider   ergocalciferol (VITAMIN D2) 50,000 unit capsule Take 50,000 Units by mouth. Yes Historical Provider   sodium chloride 1 gram tablet Take 1 g by mouth daily. Yes Historical Provider   Cetirizine (ZYRTEC) 10 mg cap Take  by mouth as needed. Yes Historical Provider   ergocalciferol, vitamin d2, 1,000 unit cap Take  by mouth. Yes Historical Provider   latanoprost (XALATAN) 0.005 % ophthalmic solution Administer 1 Drop to both eyes nightly. Yes Historical Provider   ezetimibe (ZETIA) 10 mg tablet Take  by mouth. Yes Historical Provider   pantoprazole (PROTONIX) 40 mg tablet Take 1 Tab by mouth daily. 2/22/16   Yumiko Carranza MD   oxyCODONE-acetaminophen (PERCOCET) 5-325 mg per tablet Take 1 Tab by mouth every four (4) hours as needed for Pain. Jae Colon MD   potassium & sodium citrate-citric acid (POLYCITRA-LC) 550-500-334 mg/5 mL solution Take 30 mL by mouth three (3) times daily (with meals). Jae Colon MD   valACYclovir (VALTREX) 500 mg tablet Take 1,000 mg by mouth two (2) times a day.     Jae Colon MD       Allergies   Allergen Reactions    Atorvastatin Hives    Levofloxacin Nausea and Vomiting    Losartan Drowsiness    Ramipril Other (comments)    Simvastatin Shortness of Breath    Statins-Hmg-Coa Reductase Inhibitors Other (comments)     Body aches       Review of Systems  Constitutional: negative for fevers, chills and sweats  Respiratory: negative for negative, cough or sputum  Cardiovascular: negative for chest pain, chest pressure/discomfort, dyspnea  Gastrointestinal: negative for dysphagia, nausea and vomiting  Musculoskeletal:positive for myalgias, arthralgias and stiff joints  Neurological: negative for tremor and weakness      Physical Exam:      Visit Vitals    /59    Pulse 66    Temp 98.3 °F (36.8 °C)    Resp 16    Ht 4' 11\" (1.499 m)    Wt 47.6 kg (105 lb)    SpO2 100%    BMI 21.21 kg/m2       Physical Exam:  General: Alert and Oriented X 3  Lungs: Clear to ausculation bilaterally  Cardiovascular: Regular Rate and Rhythm, without murmur  Abdomen: Soft, nontender with positive bowel sounds in all four quadrants  Gential/Rectal: deferred  Musculoskeletal:left leg shortened external rotated  Gross motor and sensory intact  Abrasion left elbow    Labs Reviewed:  BMP:   Lab Results   Component Value Date/Time     (L) 06/21/2017 02:30 AM    K 5.0 06/21/2017 02:30 AM    CL 92 (L) 06/21/2017 02:30 AM    CO2 13 (L) 06/21/2017 02:30 AM    AGAP 17 06/21/2017 02:30 AM    GLU 82 06/21/2017 02:30 AM    BUN 25 (H) 06/21/2017 02:30 AM    CREA 2.06 (H) 06/21/2017 02:30 AM    GFRAA 28 (L) 06/21/2017 02:30 AM    GFRNA 23 (L) 06/21/2017 02:30 AM     CMP:   Lab Results   Component Value Date/Time     (L) 06/21/2017 02:30 AM    K 5.0 06/21/2017 02:30 AM    CL 92 (L) 06/21/2017 02:30 AM    CO2 13 (L) 06/21/2017 02:30 AM    AGAP 17 06/21/2017 02:30 AM    GLU 82 06/21/2017 02:30 AM    BUN 25 (H) 06/21/2017 02:30 AM    CREA 2.06 (H) 06/21/2017 02:30 AM    GFRAA 28 (L) 06/21/2017 02:30 AM    GFRNA 23 (L) 06/21/2017 02:30 AM    CA 8.3 (L) 06/21/2017 02:30 AM    ALB 3.6 06/21/2017 02:30 AM    TP 6.1 (L) 06/21/2017 02:30 AM    GLOB 2.5 06/21/2017 02:30 AM    AGRAT 1.4 06/21/2017 02:30 AM    SGOT 46 (H) 06/21/2017 02:30 AM    ALT 28 06/21/2017 02:30 AM     CBC:   Lab Results   Component Value Date/Time    WBC 22.7 (H) 06/21/2017 02:30 AM    HGB 9.8 (L) 06/21/2017 02:30 AM    HCT 27.6 (L) 06/21/2017 02:30 AM     06/21/2017 02:30 AM       Assessment/Plan     Principal Problem:    Closed left hip fracture (HCC) (6/21/2017)    Active Problems:    Myelodysplasia (myelodysplastic syndrome) (HCC) ()      Crohn's disease (HCC) ()      Hypertension ()      CKD (chronic kidney disease) stage 3, GFR 30-59 ml/min ()      Overview: stage 3   from stone      Hyponatremia (6/21/2017)      Dementia (6/21/2017)        Left Intertrochanteric fracture  electrolyte imbalance  Pending medical clearance for fixation of left hip

## 2017-06-22 NOTE — PROGRESS NOTES
Hospitalist Progress Note    Patient: Lyndsay Draper MRN: [id number]  CSN: [id number]    YOB: 1940  Age: 64 y.o. Sex: female    DOA: 6/21/2017 LOS:  LOS: 1 day                Assessment/Plan     Patient Active Problem List   Diagnosis Code    Myelodysplasia (myelodysplastic syndrome) ([facility] D46.9    Crohn's disease ([facility] K50.90    Hypertension I10    CKD (chronic kidney disease) stage 3, GFR 30-59 ml/min N18.3    Closed left hip fracture ([facility] S72.002A    Hyponatremia E87.1    Dementia F36.80            65 yo female with history of crohn's, s/p colostomy, myelodysplasia admitted for left hip fracture from fall. Left hip fracture - seen by orthopedics, plan for surgical repair. Hyponatremia - hypotonic, euvolemic, sodium improving with IVF, last known sodium as outpatient at 130. Will continue with gentle IVF. Anemia - secondary to myelodysplasia, getting 2 units of blood. Follow up H/H. Seen by oncology. CKD - monitor renal function. Crohn's disease, not on any outpatient medications. Patient is getting blood transfusion. Her EKG reviewed, no change from previous EKG, CXR with no acute findings, no infiltrate. Follow up H/H if her Hb is above 8 she can proceed with surgery, she is moderate risk for her age and co morbidities. 45 total min's spent on patient care including >50% on counseling/coordinating care. Discussed the above assessments. also discussed labs, medications and hospital course. Review of systems  General: No fevers or chills. Cardiovascular: No chest pain or pressure. No palpitations. Pulmonary: No shortness of breath. Gastrointestinal: No nausea, vomiting. Physical Exam:  General: Awake, cooperative, no acute distress    HEENT: NC, Atraumatic. PERRLA, anicteric sclerae. Lungs: CTA Bilaterally. No Wheezing/Rhonchi/Rales.   Heart:  Regular  rhythm,  No murmur, No Rubs, No Gallops  Abdomen: Soft, Non distended, Non tender.  +Bowel sounds, Extremities: No c/c/e  Psych:   Not anxious or agitated. Neurologic:  No acute neurological deficit. Vital signs/Intake and Output:  Visit Vitals    /66    Pulse 81    Temp 98.5 °F (36.9 °C)    Resp 18    Ht 4' 11\" (1.499 m)    Wt 47.3 kg (104 lb 4.8 oz)    SpO2 100%    BMI 21.07 kg/m2     Current Shift:  06/22 0701 - 06/22 1900  In: -   Out: 450 [Urine:450]  Last three shifts:  06/20 1901 - 06/22 0700  In: 2061.7 [I.V.:2061.7]  Out: 1440 [Urine:850]            Labs: Results:       Chemistry Recent Labs      06/22/17   0558  06/21/17   2250  06/21/17   1655   06/21/17   0230   GLU  130*  166*  101*   < >  82   NA  130*  127*  126*   < >  122*   K  4.9  5.1  5.6*   < >  5.0   CL  100  98*  97*   < >  92*   CO2  19*  19*  17*   < >  13*   BUN  20*  19*  20*   < >  25*   CREA  1.43*  1.56*  1.59*   < >  2.06*   CA  7.7*  7.4*  7.4*   < >  8.3*   AGAP  11  10  12   < >  17   BUCR  14  12  13   < >  12   AP   --    --    --    --   42*   TP   --    --    --    --   6.1*   ALB   --    --    --    --   3.6   GLOB   --    --    --    --   2.5   AGRAT   --    --    --    --   1.4    < > = values in this interval not displayed. CBC w/Diff Recent Labs      06/22/17   0651  06/21/17   0230   WBC  19.4*  22.7*   RBC  1.89*  2.71*   HGB  6.9*  9.8*   HCT  19.4*  27.6*   PLT  83*  147   GRANS   --   83*   LYMPH   --   4*   EOS   --   0      Cardiac Enzymes Recent Labs      06/21/17   0230   CPK  35   CKND1  Cannot be calulated      Coagulation Recent Labs      06/21/17   0230   PTP  14.4   INR  1.2   APTT  29.3       Lipid Panel No results found for: CHOL, CHOLPOCT, CHOLX, CHLST, CHOLV, 264056, HDL, LDL, LDLC, DLDLP, 367707, VLDLC, VLDL, TGLX, TRIGL, TRIGP, TGLPOCT, CHHD, CHHDX   BNP No results for input(s): BNPP in the last 72 hours.    Liver Enzymes Recent Labs      06/21/17   0230   TP  6.1*   ALB  3.6   AP  42*   SGOT  46*      Thyroid Studies Lab Results   Component Value Date/Time TSH 0.60 06/21/2017 01:24 PM        Procedures/imaging: see electronic medical records for all procedures/Xrays and details which were not copied into this note but were reviewed prior to creation of Plan

## 2017-06-22 NOTE — PROGRESS NOTES
Received patient at 0700, patient is alert and oriented with period of confusion, lungs are clear, diminished at the bases, bowel sounds are active in all 4 quadrants. Patient complain of pain, which she was medicated for, pain medication effective. Patient is schedule for hip surgery with Dr Vignesh Eubanks, patient received 2 units of blood per Dr Molly Cid order. report called to preoperative nurse Anton Hill RN. Vital sign has been within normal limits,  has been by patient side all day. Will continue to monitor.

## 2017-06-22 NOTE — ROUTINE PROCESS
Bedside and Verbal shift change report given to AZUCENA Bull RN (oncoming nurse) by RONY Callaway RN (offgoing nurse). Report included the following information SBAR, Kardex, Intake/Output and MAR.

## 2017-06-22 NOTE — PROGRESS NOTES
Phone: [phone]  Paging : [phone]      Hematology / Oncology Progress Note    Admit Date: 6/21/2017    Assessment:     Hem/Onc Issues:  CMML, on single agent hydrea for count control  Reasons for Admission: Left hip pain post fall,  fracture  Other Active Issues:    ·     Principal Problem:    Closed left hip fracture (HCC) (6/21/2017)    Active Problems:    Myelodysplasia (myelodysplastic syndrome) (HCC) ()      Crohn's disease (HCC) ()      Hypertension ()      CKD (chronic kidney disease) stage 3, GFR 30-59 ml/min ()      Overview: stage 3   from stone      Hyponatremia (6/21/2017)      Dementia (6/21/2017)        Plan:     · Transfuse 2 units of blood today for Hgb 6.9  · Will give lasix in between units  · Likely surgery later today  · Other management per Surgery and Medicine team  · We will follow as needed      Subjectives:     Left hip pain    Objectives:      VITAL SIGNS:   Patient Vitals for the past 24 hrs:   BP Temp Pulse Resp SpO2 Weight   06/22/17 0722 126/62 98.2 °F (36.8 °C) 80 16 100 % -   06/22/17 0351 113/56 98.3 °F (36.8 °C) 89 16 100 % -   06/21/17 2359 116/54 98.8 °F (37.1 °C) 83 18 99 % -   06/21/17 2347 - - - - - 47.3 kg (104 lb 4.8 oz)   06/21/17 1931 121/52 99.2 °F (37.3 °C) 66 16 99 % -   06/21/17 1147 118/67 97.8 °F (36.6 °C) 69 16 100 % -     GENERAL: normal appearance, no acute distress. HEENT: conjunctivae normal, eyelids normal, PERRLA, anicteric, external ears and nose normal, hearing grossly normal.   NECK: supple, no masses. LUNG: breathing comfortably, lungs clear to auscultation and percussion. CARDIOVASCULAR: normal heart sounds, heart rhythm regular, no peripheral edema. ABDOMEN: soft. bowel sounds normal, non-tender non distension, no hepatosplenomegaly   PELVIS: pelvic exam deferred. RECTUM: rectal exam deferred. LYMPH NODES: no cervical adenopathy, no axillary adenopathy, no supraclavicular adenopathy, no infraclavicular adenopathy.    SKIN: no rash, no petechiae, no ecchymosis, no pallor, no cutaneous nodules. MUSCULOSKELETAL: normal muscle strength. NEUROLOGIC: no focal motor deficit, normal gait, no abnormal mental status. PSYCHIATRIC: oriented to person, time and place, mood and affect appropriate. Medications:      Current Medications:    Current Facility-Administered Medications   Medication Dose Route Frequency    0.9% sodium chloride infusion 250 mL  250 mL IntraVENous PRN    0.9% sodium chloride infusion 250 mL  250 mL IntraVENous PRN    0.9% sodium chloride infusion  100 mL/hr IntraVENous CONTINUOUS    acetaminophen (TYLENOL) tablet 650 mg  650 mg Oral Q6H PRN    multivitamin, tx-iron-ca-min (THERA-M w/ IRON) tablet 1 Tab  1 Tab Oral DAILY    sodium chloride tablet 1 g  1 g Oral DAILY    latanoprost (XALATAN) 0.005 % ophthalmic solution 1 Drop  1 Drop Both Eyes QHS    ondansetron (ZOFRAN) injection 4 mg  4 mg IntraVENous Q6H PRN    diazePAM (VALIUM) tablet 2 mg  2 mg Oral Q6H PRN    donepezil (ARICEPT) tablet 5 mg  5 mg Oral QHS    famotidine (PEPCID) tablet 20 mg  20 mg Oral BID    morphine injection 1 mg  1 mg IntraVENous Q3H PRN    ketorolac (TORADOL) injection 30 mg  30 mg IntraVENous Q6H PRN       Allergies:     Allergies   Allergen Reactions    Atorvastatin Hives    Levofloxacin Nausea and Vomiting    Losartan Drowsiness    Ramipril Other (comments)    Simvastatin Shortness of Breath    Statins-Hmg-Coa Reductase Inhibitors Other (comments)     Body aches         Ancillary Study Results:      Laboratory:    Recent Results (from the past 24 hour(s))   METABOLIC PANEL, BASIC    Collection Time: 06/21/17  1:24 PM   Result Value Ref Range    Sodium 126 (L) 136 - 145 mmol/L    Potassium 5.4 3.5 - 5.5 mmol/L    Chloride 96 (L) 100 - 108 mmol/L    CO2 18 (L) 21 - 32 mmol/L    Anion gap 12 3.0 - 18 mmol/L    Glucose 77 74 - 99 mg/dL    BUN 19 (H) 7.0 - 18 MG/DL    Creatinine 1.57 (H) 0.6 - 1.3 MG/DL    BUN/Creatinine ratio 12 12 - 20      GFR est AA 39 (L) >60 ml/min/1.73m2    GFR est non-AA 32 (L) >60 ml/min/1.73m2    Calcium 7.5 (L) 8.5 - 10.1 MG/DL   TSH 3RD GENERATION    Collection Time: 06/21/17  1:24 PM   Result Value Ref Range    TSH 0.60 0.36 - 2.36 uIU/mL   METABOLIC PANEL, BASIC    Collection Time: 06/21/17  4:55 PM   Result Value Ref Range    Sodium 126 (L) 136 - 145 mmol/L    Potassium 5.6 (H) 3.5 - 5.5 mmol/L    Chloride 97 (L) 100 - 108 mmol/L    CO2 17 (L) 21 - 32 mmol/L    Anion gap 12 3.0 - 18 mmol/L    Glucose 101 (H) 74 - 99 mg/dL    BUN 20 (H) 7.0 - 18 MG/DL    Creatinine 1.59 (H) 0.6 - 1.3 MG/DL    BUN/Creatinine ratio 13 12 - 20      GFR est AA 38 (L) >60 ml/min/1.73m2    GFR est non-AA 31 (L) >60 ml/min/1.73m2    Calcium 7.4 (L) 8.5 - 77.9 MG/DL   METABOLIC PANEL, BASIC    Collection Time: 06/21/17 10:50 PM   Result Value Ref Range    Sodium 127 (L) 136 - 145 mmol/L    Potassium 5.1 3.5 - 5.5 mmol/L    Chloride 98 (L) 100 - 108 mmol/L    CO2 19 (L) 21 - 32 mmol/L    Anion gap 10 3.0 - 18 mmol/L    Glucose 166 (H) 74 - 99 mg/dL    BUN 19 (H) 7.0 - 18 MG/DL    Creatinine 1.56 (H) 0.6 - 1.3 MG/DL    BUN/Creatinine ratio 12 12 - 20      GFR est AA 39 (L) >60 ml/min/1.73m2    GFR est non-AA 32 (L) >60 ml/min/1.73m2    Calcium 7.4 (L) 8.5 - 97.4 MG/DL   METABOLIC PANEL, BASIC    Collection Time: 06/22/17  5:58 AM   Result Value Ref Range    Sodium 130 (L) 136 - 145 mmol/L    Potassium 4.9 3.5 - 5.5 mmol/L    Chloride 100 100 - 108 mmol/L    CO2 19 (L) 21 - 32 mmol/L    Anion gap 11 3.0 - 18 mmol/L    Glucose 130 (H) 74 - 99 mg/dL    BUN 20 (H) 7.0 - 18 MG/DL    Creatinine 1.43 (H) 0.6 - 1.3 MG/DL    BUN/Creatinine ratio 14 12 - 20      GFR est AA 43 (L) >60 ml/min/1.73m2    GFR est non-AA 35 (L) >60 ml/min/1.73m2    Calcium 7.7 (L) 8.5 - 10.1 MG/DL   CBC W/O DIFF    Collection Time: 06/22/17  6:51 AM   Result Value Ref Range    WBC 19.4 (H) 4.6 - 13.2 K/uL    RBC 1.89 (L) 4.20 - 5.30 M/uL    HGB 6.9 (L) 12.0 - 16.0 g/dL    HCT 19.4 (L) 35.0 - 45.0 %    .6 (H) 74.0 - 97.0 FL    MCH 36.5 (H) 24.0 - 34.0 PG    MCHC 35.6 31.0 - 37.0 g/dL    RDW 12.6 11.6 - 14.5 %    PLATELET 83 (L) 495 - 420 K/uL    MPV 10.5 9.2 - 11.8 FL         Radiology:   No results found. Tyrone Garcia.  Ashlie Seo MD, PhD, [address]  Phone: [phone]  Pager: [phone]

## 2017-06-22 NOTE — ANESTHESIA PREPROCEDURE EVALUATION
Anesthetic History   No history of anesthetic complications     Pertinent negatives: No PONV       Review of Systems / Medical History  Patient summary reviewed, nursing notes reviewed and pertinent labs reviewed    Pulmonary  Within defined limits                 Neuro/Psych   Within defined limits          Comments: Memory loss Cardiovascular    Hypertension: well controlled                   GI/Hepatic/Renal         Renal disease: CRI and stones    Pertinent negatives: No GERD and liver disease   Endo/Other  Within defined limits        Pertinent negatives: No diabetes   Other Findings   Comments: Myelodysplastic syndrome 3 years.     Chronis - > ostomy           Physical Exam    Airway  Mallampati: III  TM Distance: 4 - 6 cm  Neck ROM: decreased range of motion   Mouth opening: Normal     Cardiovascular               Dental         Pulmonary                 Abdominal         Other Findings            Anesthetic Plan    ASA: 3  Anesthesia type: spinal            Anesthetic plan and risks discussed with: Patient

## 2017-06-22 NOTE — ROUTINE PROCESS
Bedside and Verbal shift change report given to Edda Dandy, RN (oncoming nurse) by mitral regurgitation   (offgoing nurse). Report included the following information SBAR, Kardex, Intake/Output, MAR and Recent Results.

## 2017-06-22 NOTE — ROUTINE PROCESS
TRANSFER - OUT REPORT:    Verbal report given to [facility], RN (name) on Tamera Andrew  being transferred to preop (unit) for ordered procedure       Report consisted of patients Situation, Background, Assessment and   Recommendations(SBAR). Information from the following report(s) SBAR, Kardex, Intake/Output, MAR and Recent Results was reviewed with the receiving nurse. Lines:   Peripheral IV 06/21/17 Right Wrist (Active)   Site Assessment Clean, dry, & intact 6/22/2017  7:11 PM   Phlebitis Assessment 0 6/22/2017  7:11 PM   Infiltration Assessment 0 6/22/2017  7:11 PM   Dressing Status Clean, dry, & intact 6/22/2017  7:11 PM   Dressing Type Transparent;Tape 6/22/2017  7:11 PM   Hub Color/Line Status Infusing 6/22/2017  7:11 PM   Action Taken Open ports on tubing capped 6/22/2017  4:50 PM   Alcohol Cap Used Yes 6/22/2017  4:50 PM        Opportunity for questions and clarification was provided.       Patient transported with:   [employer]

## 2017-06-22 NOTE — DISCHARGE INSTRUCTIONS
Lower Extremity Surgery  Discharge Instructions      Please take the time to review the following instructions before you leave the hospital and use them as guidelines during your recovery from surgery. If you have any questions you may contact my office at [phone]. Wound Care/Dressing Changes:        [x]   You may change your dressing as needed. · It isn't necessary to apply antibiotic ointment to your incisions. Sutures will be removed at your one week post-op visit. Staples (if you have them) are removed in two weeks. If you have steri-strips over your incision they will start to peel off in 7-10 days as you get them wet. They don't need to be removed prior to that. When they begin to peel off you can remove them. They should all be removed by 14 days from your surgery. If your wound is closed with Dermabond nothing has to be done or removed. Showering/Bathing:        [x]   Do not get the dressing wet. Once you remove it two days from surgery, you may get the incision wet if there is no drainage. Weight Bearing Status/Braces/Activity:      [x]   Partial-weight bearing. Please do not bear any weight over 25% on your leg. You may use your toes for balance when walking with a cane or crutches. Ice/Elevation:    Continue ice and elevation consistently for 48 hours after surgery. When elevating your knee elevate it on about 4 pillows to be sure it is above your heart. After 48 hours, you should ice your knee 3 times per day, for 20 minutes at a time for the next 5 days. After one week from surgery, you may use ice and elevation as needed for pain and swelling. Diet:    You may advance to your regular diet as tolerated. Medication:    1. You will be given a prescription for pain medications when you are discharged from the hospital.  Take the medication as needed according to the directions on the prescription bottle.   Possible side effects of the medication include dizziness, headache, nausea, vomiting, constipation and urinary retention. If you experience any of these side effects call the office so that we can assist you in relieving them. Discontinue the use of the pain medication if you develop itching, rash, shortness of breath or difficulties swallowing. If these symptoms become severe or aren't relieved by discontinuing the medication you should seek immediate medical attention. Refills of pain medication are authorized during office hours only (8AM - 5PM Mon thru Fri). 2. If you were prescribed Percocet/oxycodone or Dilaudid/hydromorphone you must have a written prescription. These medications legally cannot be called in to a pharmacy. 3. You may take over the counter Ibuprofen/Advil/Aleve between dosages of your pain medication if needed. Do not take Tylenol in addition to your pain medication as most of the pain medication already contains Tylenol. Exceptions include Dilaudid/hydromorphone, Demerol/meperidine and roxicodone. Do not exceed 3000 mg of Tylenol per day. Ex:  (hydrocodon 5/325mg = 325 mg of Tylenol)   4. If you have had a joint replacement you should take Xarelto 10 mg daily for 28 days from the date of your surgery. This will help to prevent blood clots from forming in your legs. 5. You may resume the medication you were taking prior to your surgery. Pain medication may change the effects of any antidepressant medication. If you have any questions about possible interactions between your regular medications and the pain medication you should consult the physician who prescribes your regular medications. Follow Up Appointment:    If you are unsure of your follow-up appointment date and time, please call [phone]. Please let our  know you are scheduling a post-op appointment. Most appointments should be between 7-14 days after your surgery.     Physical Therapy:    []   Physical therapy will consist of partial weight bearing for standing transfers       Important signs and symptoms:    If any of the following signs and symptoms occurs, you should contact Dr. Ryder Lopez office. Please be advised if a problem arises which you feel required immediate medical attention or your are unable to contact Dr. Ryder Lopez office you should seek immediate medical attention. Signs and symptoms to watch for include:  1. A sudden increase in swelling and/or redness or warmth at the area your surgery was performed which isn't relieved by rest, ice and elevation. 2. Oral temperature greater than 101.5 degrees for 12 hours or more which isn't relieved by an increase in fluid intake and taking two Tylenol every 4-6 hours. Do not exceed 3000 mg of Tylenol per day. 3. Excessive drainage from your incisions or drainage that hasn't stopped by 72 hours. 4. Calf pian, tenderness, redness or swelling which isn't relieved with rest and elevation. 5. Fever, chills, shortness of breath, chest pain, nausea, vomiting or other signs and symptoms which are of concern to you.

## 2017-06-22 NOTE — ROUTINE PROCESS
Bedside and Verbal shift change report given to Torres Ramos RN (oncoming nurse) by Jada Hairston   (offgoing nurse). Report included the following information SBAR, Intake/Output and MAR.       Pt had uneventful shift and tolerated medications well  Pt complained of constant sharp pain during shift, medicated via MAR  Notified Dr. Ki Hillman of pt's pain

## 2017-06-22 NOTE — INTERVAL H&P NOTE
H&P Update:  Joe Bone was seen and examined. History and physical has been reviewed. The patient has been examined. There have been no significant clinical changes since the completion of the originally dated History and Physical.  Patient identified by surgeon; surgical site was confirmed by patient and surgeon.   Plan for IM nail left femur for unstable intertrochanteric fracture     Signed By: Malik Aburto DO     June 22, 2017 4:47 PM

## 2017-06-23 ENCOUNTER — APPOINTMENT (OUTPATIENT)
Dept: GENERAL RADIOLOGY | Age: 77
DRG: 481 | End: 2017-06-23
Attending: ORTHOPAEDIC SURGERY
Payer: MEDICARE

## 2017-06-23 LAB
ANION GAP BLD CALC-SCNC: 10 MMOL/L (ref 3–18)
ANION GAP BLD CALC-SCNC: 11 MMOL/L (ref 3–18)
ANION GAP BLD CALC-SCNC: 8 MMOL/L (ref 3–18)
BASOPHILS # BLD AUTO: 0 K/UL (ref 0–0.1)
BASOPHILS # BLD: 0 % (ref 0–3)
BUN SERPL-MCNC: 13 MG/DL (ref 7–18)
BUN SERPL-MCNC: 14 MG/DL (ref 7–18)
BUN SERPL-MCNC: 15 MG/DL (ref 7–18)
BUN/CREAT SERPL: 11 (ref 12–20)
BUN/CREAT SERPL: 9 (ref 12–20)
BUN/CREAT SERPL: 9 (ref 12–20)
CALCIUM SERPL-MCNC: 6.9 MG/DL (ref 8.5–10.1)
CALCIUM SERPL-MCNC: 6.9 MG/DL (ref 8.5–10.1)
CALCIUM SERPL-MCNC: 7.3 MG/DL (ref 8.5–10.1)
CHLORIDE SERPL-SCNC: 102 MMOL/L (ref 100–108)
CHLORIDE SERPL-SCNC: 102 MMOL/L (ref 100–108)
CHLORIDE SERPL-SCNC: 103 MMOL/L (ref 100–108)
CO2 SERPL-SCNC: 19 MMOL/L (ref 21–32)
CO2 SERPL-SCNC: 19 MMOL/L (ref 21–32)
CO2 SERPL-SCNC: 20 MMOL/L (ref 21–32)
CREAT SERPL-MCNC: 1.32 MG/DL (ref 0.6–1.3)
CREAT SERPL-MCNC: 1.37 MG/DL (ref 0.6–1.3)
CREAT SERPL-MCNC: 1.62 MG/DL (ref 0.6–1.3)
DIFFERENTIAL METHOD BLD: ABNORMAL
EOSINOPHIL # BLD: 0 K/UL (ref 0–0.4)
EOSINOPHIL NFR BLD: 0 % (ref 0–5)
ERYTHROCYTE [DISTWIDTH] IN BLOOD BY AUTOMATED COUNT: 16.4 % (ref 11.6–14.5)
ERYTHROCYTE [DISTWIDTH] IN BLOOD BY AUTOMATED COUNT: 16.6 % (ref 11.6–14.5)
EST. AVERAGE GLUCOSE BLD GHB EST-MCNC: NORMAL MG/DL
GLUCOSE BLD STRIP.AUTO-MCNC: 119 MG/DL (ref 70–110)
GLUCOSE BLD STRIP.AUTO-MCNC: 162 MG/DL (ref 70–110)
GLUCOSE BLD STRIP.AUTO-MCNC: 86 MG/DL (ref 70–110)
GLUCOSE SERPL-MCNC: 131 MG/DL (ref 74–99)
GLUCOSE SERPL-MCNC: 209 MG/DL (ref 74–99)
GLUCOSE SERPL-MCNC: 211 MG/DL (ref 74–99)
HBA1C MFR BLD: 4.8 % (ref 4.5–5.6)
HCT VFR BLD AUTO: 17.7 % (ref 35–45)
HCT VFR BLD AUTO: 19.7 % (ref 35–45)
HCT VFR BLD AUTO: 22.9 % (ref 35–45)
HGB BLD-MCNC: 6.2 G/DL (ref 12–16)
HGB BLD-MCNC: 7 G/DL (ref 12–16)
HGB BLD-MCNC: 8.2 G/DL (ref 12–16)
LYMPHOCYTES # BLD AUTO: 13 % (ref 20–51)
LYMPHOCYTES # BLD: 4.6 K/UL (ref 0.8–3.5)
MCH RBC QN AUTO: 32 PG (ref 24–34)
MCH RBC QN AUTO: 33.7 PG (ref 24–34)
MCHC RBC AUTO-ENTMCNC: 35 G/DL (ref 31–37)
MCHC RBC AUTO-ENTMCNC: 35.8 G/DL (ref 31–37)
MCV RBC AUTO: 89.5 FL (ref 74–97)
MCV RBC AUTO: 96.2 FL (ref 74–97)
MONOCYTES # BLD: 6 K/UL (ref 0–1)
MONOCYTES NFR BLD AUTO: 17 % (ref 2–9)
NEUTS BAND NFR BLD MANUAL: 11 % (ref 0–5)
NEUTS SEG # BLD: 20.9 K/UL (ref 1.8–8)
NEUTS SEG NFR BLD AUTO: 59 % (ref 42–75)
NRBC BLD-RTO: 1 PER 100 WBC
PLATELET # BLD AUTO: 41 K/UL (ref 135–420)
PLATELET # BLD AUTO: 82 K/UL (ref 135–420)
PLATELET COMMENTS,PCOM: ABNORMAL
PMV BLD AUTO: 12.2 FL (ref 9.2–11.8)
PMV BLD AUTO: 9.8 FL (ref 9.2–11.8)
POTASSIUM SERPL-SCNC: 3.1 MMOL/L (ref 3.5–5.5)
POTASSIUM SERPL-SCNC: 3.2 MMOL/L (ref 3.5–5.5)
POTASSIUM SERPL-SCNC: 5 MMOL/L (ref 3.5–5.5)
RBC # BLD AUTO: 1.84 M/UL (ref 4.2–5.3)
RBC # BLD AUTO: 2.56 M/UL (ref 4.2–5.3)
RBC MORPH BLD: ABNORMAL
SODIUM SERPL-SCNC: 131 MMOL/L (ref 136–145)
SODIUM SERPL-SCNC: 131 MMOL/L (ref 136–145)
SODIUM SERPL-SCNC: 132 MMOL/L (ref 136–145)
WBC # BLD AUTO: 29.9 K/UL (ref 4.6–13.2)
WBC # BLD AUTO: 35.4 K/UL (ref 4.6–13.2)
WBC MORPH BLD: ABNORMAL

## 2017-06-23 PROCEDURE — 80048 BASIC METABOLIC PNL TOTAL CA: CPT | Performed by: HOSPITALIST

## 2017-06-23 PROCEDURE — 85018 HEMOGLOBIN: CPT | Performed by: HOSPITALIST

## 2017-06-23 PROCEDURE — 97530 THERAPEUTIC ACTIVITIES: CPT

## 2017-06-23 PROCEDURE — 74011250637 HC RX REV CODE- 250/637: Performed by: HOSPITALIST

## 2017-06-23 PROCEDURE — 74011250636 HC RX REV CODE- 250/636: Performed by: HOSPITALIST

## 2017-06-23 PROCEDURE — 97162 PT EVAL MOD COMPLEX 30 MIN: CPT

## 2017-06-23 PROCEDURE — P9035 PLATELET PHERES LEUKOREDUCED: HCPCS | Performed by: INTERNAL MEDICINE

## 2017-06-23 PROCEDURE — 85025 COMPLETE CBC W/AUTO DIFF WBC: CPT | Performed by: HOSPITALIST

## 2017-06-23 PROCEDURE — 30233R1 TRANSFUSION OF NONAUTOLOGOUS PLATELETS INTO PERIPHERAL VEIN, PERCUTANEOUS APPROACH: ICD-10-PCS | Performed by: INTERNAL MEDICINE

## 2017-06-23 PROCEDURE — 82962 GLUCOSE BLOOD TEST: CPT

## 2017-06-23 PROCEDURE — 97161 PT EVAL LOW COMPLEX 20 MIN: CPT

## 2017-06-23 PROCEDURE — 85027 COMPLETE CBC AUTOMATED: CPT | Performed by: ORTHOPAEDIC SURGERY

## 2017-06-23 PROCEDURE — 77030011256 HC DRSG MEPILEX <16IN NO BORD MOLN -A

## 2017-06-23 PROCEDURE — 83036 HEMOGLOBIN GLYCOSYLATED A1C: CPT | Performed by: HOSPITALIST

## 2017-06-23 PROCEDURE — 74011250636 HC RX REV CODE- 250/636: Performed by: ORTHOPAEDIC SURGERY

## 2017-06-23 PROCEDURE — 74011636637 HC RX REV CODE- 636/637: Performed by: HOSPITALIST

## 2017-06-23 PROCEDURE — 74011250637 HC RX REV CODE- 250/637: Performed by: ORTHOPAEDIC SURGERY

## 2017-06-23 PROCEDURE — 65660000000 HC RM CCU STEPDOWN

## 2017-06-23 PROCEDURE — 36415 COLL VENOUS BLD VENIPUNCTURE: CPT | Performed by: ORTHOPAEDIC SURGERY

## 2017-06-23 PROCEDURE — 80048 BASIC METABOLIC PNL TOTAL CA: CPT | Performed by: ORTHOPAEDIC SURGERY

## 2017-06-23 PROCEDURE — 36430 TRANSFUSION BLD/BLD COMPNT: CPT

## 2017-06-23 PROCEDURE — 97166 OT EVAL MOD COMPLEX 45 MIN: CPT

## 2017-06-23 PROCEDURE — P9016 RBC LEUKOCYTES REDUCED: HCPCS | Performed by: EMERGENCY MEDICINE

## 2017-06-23 RX ORDER — SODIUM CHLORIDE AND POTASSIUM CHLORIDE .9; .15 G/100ML; G/100ML
SOLUTION INTRAVENOUS CONTINUOUS
Status: DISCONTINUED | OUTPATIENT
Start: 2017-06-23 | End: 2017-06-23

## 2017-06-23 RX ORDER — INSULIN LISPRO 100 [IU]/ML
INJECTION, SOLUTION INTRAVENOUS; SUBCUTANEOUS
Status: DISCONTINUED | OUTPATIENT
Start: 2017-06-23 | End: 2017-06-26 | Stop reason: HOSPADM

## 2017-06-23 RX ORDER — POTASSIUM CHLORIDE 7.45 MG/ML
10 INJECTION INTRAVENOUS
Status: DISCONTINUED | OUTPATIENT
Start: 2017-06-23 | End: 2017-06-23

## 2017-06-23 RX ORDER — DEXTROSE 50 % IN WATER (D50W) INTRAVENOUS SYRINGE
25-50 AS NEEDED
Status: DISCONTINUED | OUTPATIENT
Start: 2017-06-23 | End: 2017-06-26 | Stop reason: HOSPADM

## 2017-06-23 RX ORDER — SODIUM CHLORIDE 9 MG/ML
100 INJECTION, SOLUTION INTRAVENOUS CONTINUOUS
Status: DISCONTINUED | OUTPATIENT
Start: 2017-06-23 | End: 2017-06-26 | Stop reason: HOSPADM

## 2017-06-23 RX ORDER — POTASSIUM CHLORIDE 20 MEQ/1
40 TABLET, EXTENDED RELEASE ORAL
Status: COMPLETED | OUTPATIENT
Start: 2017-06-23 | End: 2017-06-23

## 2017-06-23 RX ORDER — SODIUM CHLORIDE 9 MG/ML
250 INJECTION, SOLUTION INTRAVENOUS AS NEEDED
Status: DISCONTINUED | OUTPATIENT
Start: 2017-06-23 | End: 2017-06-25 | Stop reason: SDUPTHER

## 2017-06-23 RX ORDER — MAGNESIUM SULFATE 100 %
4 CRYSTALS MISCELLANEOUS AS NEEDED
Status: DISCONTINUED | OUTPATIENT
Start: 2017-06-23 | End: 2017-06-26 | Stop reason: HOSPADM

## 2017-06-23 RX ORDER — POTASSIUM CHLORIDE 7.45 MG/ML
10 INJECTION INTRAVENOUS
Status: COMPLETED | OUTPATIENT
Start: 2017-06-23 | End: 2017-06-23

## 2017-06-23 RX ADMIN — POTASSIUM CHLORIDE 10 MEQ: 10 INJECTION, SOLUTION INTRAVENOUS at 10:50

## 2017-06-23 RX ADMIN — FERROUS SULFATE TAB 325 MG (65 MG ELEMENTAL FE) 325 MG: 325 (65 FE) TAB at 10:50

## 2017-06-23 RX ADMIN — POTASSIUM CHLORIDE 40 MEQ: 20 TABLET, EXTENDED RELEASE ORAL at 10:49

## 2017-06-23 RX ADMIN — FAMOTIDINE 20 MG: 20 TABLET ORAL at 22:01

## 2017-06-23 RX ADMIN — CEFAZOLIN SODIUM 2 G: 2 SOLUTION INTRAVENOUS at 10:49

## 2017-06-23 RX ADMIN — DONEPEZIL HYDROCHLORIDE 5 MG: 5 TABLET, FILM COATED ORAL at 22:01

## 2017-06-23 RX ADMIN — LATANOPROST 1 DROP: 50 SOLUTION OPHTHALMIC at 22:03

## 2017-06-23 RX ADMIN — MULTIPLE VITAMINS W/ MINERALS TAB 1 TABLET: TAB at 10:49

## 2017-06-23 RX ADMIN — SODIUM CHLORIDE TAB 1 GM 1 G: 1 TAB at 10:50

## 2017-06-23 RX ADMIN — SODIUM CHLORIDE 100 ML/HR: 900 INJECTION, SOLUTION INTRAVENOUS at 23:08

## 2017-06-23 RX ADMIN — Medication 10 ML: at 06:12

## 2017-06-23 RX ADMIN — POTASSIUM CHLORIDE 10 MEQ: 10 INJECTION, SOLUTION INTRAVENOUS at 12:41

## 2017-06-23 RX ADMIN — INSULIN LISPRO 2 UNITS: 100 INJECTION, SOLUTION INTRAVENOUS; SUBCUTANEOUS at 13:05

## 2017-06-23 RX ADMIN — CEFAZOLIN SODIUM 2 G: 2 SOLUTION INTRAVENOUS at 01:13

## 2017-06-23 RX ADMIN — FAMOTIDINE 20 MG: 20 TABLET ORAL at 10:50

## 2017-06-23 RX ADMIN — SODIUM CHLORIDE AND POTASSIUM CHLORIDE: 9; 1.49 INJECTION, SOLUTION INTRAVENOUS at 10:51

## 2017-06-23 RX ADMIN — Medication 1 MG: at 01:13

## 2017-06-23 NOTE — PROGRESS NOTES
Verbal shift change report given to Edelmira & Enzo Spangler (oncoming nurse) by Mehnaz Paul RN (offgoing nurse). Report included the following information SBAR, Kardex and MAR.

## 2017-06-23 NOTE — PROGRESS NOTES
Problem: Self Care Deficits Care Plan (Adult)  Goal: *Acute Goals and Plan of Care (Insert Text)  Occupational Therapy Goals  Initiated 6/23/2017 within 7 day(s). 1. Patient will perform grooming with supervision/set-up   2. Patient will perform upper body dressing with supervision/set-up. 3. Patient will perform lower body dressing with minimal assistance/contact guard assist.  4. Patient will perform toilet transfers with minimal assistance/contact guard assist.  5. Patient will perform all aspects of toileting with minimal assistance/contact guard assist.  6. Patient will participate in upper extremity therapeutic exercise/activities with supervision/set-up for 5 minutes. 7. Patient will complete standing with supervision for 5 minutes during ADL to increase activity tolerance for functional activity. OCCUPATIONAL THERAPY EVALUATION     Patient: Joe Bone (74 y.o. female)  Date: 6/23/2017  Primary Diagnosis: Closed left hip fracture (HCC)  FRACTURED LEFT HIP  Procedure(s) (LRB):  INTRAMEDULLARY NAILING LEFT HIP WITH C-ARM  (Left) 1 Day Post-Op   Precautions:  Fall, TTWB      ASSESSMENT :  Based on the objective data described below, the patient presents with left hip fracture and IM nailing with TTWB. Pt max/total assist for LB dressing and toileting at this time. Noted confusion during eval. Pt oriented to self only. Pt max assist x 2 for supine to sit. Pt sitting EOB /51 at EOB with report of dizziness. Pt standing and continued to report dizziness with BP 77/47. Pt placed back into supine. Pt still awaiting a unit of blood with low HGB today. Pt unable to maintain TTWB status in standing and needed assist to maintain. Pt could benefit from OT to increase I with above mentioned deficits. Patient will benefit from skilled intervention to address the above impairments.   Patients rehabilitation potential is considered to be Good  Factors which may influence rehabilitation potential include: [ ]             None noted  [X]             Mental ability/status  [X]             Medical condition  [ ]             Home/family situation and support systems  [ ]             Safety awareness  [X]             Pain tolerance/management  [ ]             Other:        PLAN :  Recommendations and Planned Interventions:  [X]               Self Care Training                  [X]        Therapeutic Activities  [X]               Functional Mobility Training    [ ]        Cognitive Retraining  [X]               Therapeutic Exercises           [X]        Endurance Activities  [X]               Balance Training                   [X]        Neuromuscular Re-Education  [ ]               Visual/Perceptual Training     [X]   Home Safety Training  [X]               Patient Education                 [X]        Family Training/Education  [ ]               Other (comment):     Frequency/Duration: Patient will be followed by occupational therapy 1-2 times per day/4-7 days per week to address goals. Discharge Recommendations: Rehab  Further Equipment Recommendations for Discharge: N/A       SUBJECTIVE:   Patient stated That hurts.       OBJECTIVE DATA SUMMARY:       Past Medical History:   Diagnosis Date    Autoimmune disease ([facility] [address]       Chron's    Calculus of kidney      Chronic kidney disease       stage 3   from stone    Chronic kidney disease      Closed left hip fracture ([facility] [address] 6/21/2017    Crohn's disease ([facility] [address]      Epiretinal membrane      Glaucoma      Glaucoma      Gout      Hearing loss      Hernia of abdominal cavity      History of esophagogastroduodenoscopy (EGD)      Hypertension 2000    Ileostomy present ([address]      Memory problem      Menopause      Myelodysplasia (myelodysplastic syndrome) ([address]      Osteopenia       Past Surgical History:   Procedure Laterality Date    HX ABDOMINAL LAPAROSCOPY        HX APPENDECTOMY        HX BREAST BIOPSY        HX CATARACT REMOVAL        HX COLECTOMY  HX GYN         pelvic cysts    HX OTHER SURGICAL         ilieostomy    HX OTHER SURGICAL         bone biopsy    HX SHOULDER ARTHROSCOPY         right    HX TONSILLECTOMY         Barriers to Learning/Limitations: yes;  cognitive  Compensate with: visual, verbal, tactile, kinesthetic cues/model  Prior Level of Function/Home Situation: I with ADLs prior to admission  Home Situation  Home Environment: Apartment  # Steps to Enter: 0 (Elevator)  One/Two Story Residence: Other (Comment) (3rd level)  Living Alone: No  Support Systems: Spouse/Significant Other/Partner  Patient Expects to be Discharged to[de-identified] Apartment  Current DME Used/Available at Home: Shower chair  Tub or Shower Type: Shower  [ ]  Right hand dominant           [ ]  Left hand dominant  Cognitive/Behavioral Status:  Neurologic State: Alert;Confused  Orientation Level: Disoriented to place; Disoriented to situation;Disoriented to time;Oriented to person     Safety/Judgement: Decreased awareness of need for assistance;Decreased awareness of need for safety;Decreased insight into deficits  Skin: incision on left hip covered with dressing and elbow covered with dressing  Edema: min edema noted on left hip  Vision/Perceptual:  N/A  Coordination:  Coordination: Generally decreased, functional  Gross Motor Skills-Upper: Left Intact; Right Intact  Balance:  Sitting: Intact; With support  Standing: Impaired; With support  Standing - Static: Fair  Standing - Dynamic : Poor  Strength:  Strength: Generally decreased, functional  Tone & Sensation: N/A  Range of Motion:  AROM: Within functional limits  Functional Mobility and Transfers for ADLs:  Bed Mobility:  Rolling: Maximum assistance;Assist x2  Supine to Sit: Maximum assistance;Assist x2  Sit to Supine: Maximum assistance;Assist x2  Scooting: Total assistance  Transfers:  Sit to Stand: Maximum assistance;Assist x2  ADL Assessment:  Upper Body Dressing: Maximum assistance  Lower Body Dressing:  Total assistance  Toileting: Total assistance  ADL Intervention:  Cognitive Retraining  Safety/Judgement: Decreased awareness of need for assistance;Decreased awareness of need for safety;Decreased insight into deficits     Pain:  Pain Scale 1: Numeric (0 - 10)  Pain Intensity 1: 2  Pain Location 1: Hip  Pain Orientation 1: Left  Activity Tolerance:   Fair -  Please refer to the flowsheet for vital signs taken during this treatment. After treatment:   [ ] Patient left in no apparent distress sitting up in chair  [X] Patient left in no apparent distress in bed  [X] Call bell left within reach  [X] Nursing notified  [X] Caregiver present  [ ] Bed alarm activated      COMMUNICATION/EDUCATION:   [ ] Home safety education was provided and the patient/caregiver indicated understanding. [ ] Patient/family have participated as able in goal setting and plan of care. [X] Patient/family agree to work toward stated goals and plan of care. [X] Patient understands intent and goals of therapy, but is neutral about his/her participation. [ ] Patient is unable to participate in goal setting and plan of care. Thank you for this referral.  Joycelyn Singh OTR/L  Time Calculation: 44 mins      Carry  Current  CM= 80-99%    Goal  CJ= 20-39%. The severity rating is based on the Level of Assistance required for Functional Mobility and ADLs.

## 2017-06-23 NOTE — PROGRESS NOTES
Hospitalist Progress Note    Patient: Mikey Butler MRN: [id number]  CSN: [id number]    YOB: 1940  Age: 64 y.o.   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, hyponatremia, hypokalemia, she fell at home and fractured left hip  Her surgery was deferred until last night so her Na could be corrected and she was transfused last night also    Jqwldfgukmhq-zyepleyc-fsjfzmfh IV NS for now, this is in her hx and has been hospitalized prior for this, sounds priamrily due to dehydration and low intake due to dementia  Left hip fracture s/p repair-PT with ltd weight bearing, agree with DVT proph-but change to nonpharmaceutical-add SCD's-her platelets being low increase her bleeding risk-will go to SNF for d/c when ready  Dementia  Hyperglycemia-add accuchecks with SSI, check A1C, not know to be diabetic  crohns with colostomy  CML  Ac blood loss anemia-2 units PRBC plus platelets today-reviewed with heme onc  Hypokalemia-PO and IV Kcl repeltion  CKD 3    BMP in am  CBC in am  Discussed with  the plan for today  Stool softeners, pain control  D/c plan:SNF-probably 1-3 days for this after we can ensure blood counts and lytes are stable    Patient Active Problem List   Diagnosis Code    Myelodysplasia (myelodysplastic syndrome) ([facility]) D46.9    Crohn's disease ([facility] [address] K50.90    Hypertension I10    CKD (chronic kidney disease) stage 3, GFR 30-59 ml/min N18.3    Closed left hip fracture ([facility]) S72.002A    Hyponatremia E87.1    Dementia F03.90    Anemia D64.9       Subjective:    Denies hip pain this am  Is confused,  notes a bit more than usual, does have dementia    Review of systems:    Constitutional: denies fevers, chills, myalgias  Respiratory: denies SOB, cough  Cardiovascular: denies chest pain, palpitations  Gastrointestinal: denies nausea, vomiting, diarrhea      Vital signs/Intake and Output:  Visit Vitals    /65 (BP 1 Location: Right arm, BP Patient Position: At rest)    Pulse 78    Temp 97.4 °F (36.3 °C)    Resp 16    Ht 4' 11\" (1.499 m)    Wt 47.3 kg (104 lb 4.8 oz)    SpO2 97%    BMI 21.07 kg/m2     Current Shift:     Last three shifts:  06/21 1901 - 06/23 0700  In: 2095 [I.V.:2095]  Out: 2590 [Urine:1700]    Exam:    General: elderly frail WF, oriented to self, alert, NAD  Head/Neck: NCAT, supple, No masses, No lymphadenopathy  CVS:Regular rate and rhythm, no M/R/G, S1/S2 heard, no thrill  Lungs:Clear to auscultation bilaterally, no wheezes, rhonchi, or rales  Abdomen: Soft, Nontender, No distention, Normal Bowel sounds, No hepatomegaly  Extremities: No C/C/E, pulses palpable 2+, left hip dressed, no ecchymoses  Skin:normal texture and turgor, no rashes, no lesions  Psych:appropriate                Labs: Results:       Chemistry Recent Labs      06/23/17 0217 06/23/17   0003  06/22/17   1700   06/21/17   0230   GLU  211*  209*  138*   < >  82   NA  132*  131*  133*   < >  122*   K  3.1*  3.2*  4.0   < >  5.0   CL  102  102  101   < >  92*   CO2  19*  19*  18*   < >  13*   BUN  13  14  16   < >  25*   CREA  1.37*  1.32*  1.32*   < >  2.06*   CA  6.9*  6.9*  8.1*   < >  8.3*   AGAP  11  10  14   < >  17   BUCR  9*  11*  12   < >  12   AP   --    --    --    --   42*   TP   --    --    --    --   6.1*   ALB   --    --    --    --   3.6   GLOB   --    --    --    --   2.5   AGRAT   --    --    --    --   1.4    < > = values in this interval not displayed.       CBC w/Diff Recent Labs      06/23/17 0217 06/23/17   0003  06/22/17   1700  06/22/17   0651  06/21/17   0230   WBC  29.9*   --    --   19.4*  22.7*   RBC  1.84*   --    --   1.89*  2.71*   HGB  6.2*  7.0*  10.8*  6.9*  9.8*   HCT  17.7*  19.7*  30.9*  19.4*  27.6*   PLT  41*   --    --   83*  147   GRANS   --    --    --    --   83*   LYMPH   --    --    --    --   4*   EOS   --    --    --    --   0      Cardiac Enzymes Recent Labs 06/21/17 0230   CPK  35   CKND1  Cannot be calulated      Coagulation Recent Labs      06/21/17 0230   PTP  14.4   INR  1.2   APTT  29.3       Lipid Panel No results found for: CHOL, CHOLPOCT, CHOLX, CHLST, CHOLV, 592671, HDL, LDL, LDLC, DLDLP, 618419, VLDLC, VLDL, TGLX, TRIGL, TRIGP, TGLPOCT, CHHD, CHHDX   BNP No results for input(s): BNPP in the last 72 hours.    Liver Enzymes Recent Labs      06/21/17 0230   TP  6.1*   ALB  3.6   AP  42*   SGOT  46*      Thyroid Studies Lab Results   Component Value Date/Time    TSH 0.60 06/21/2017 01:24 PM        Procedures/imaging: see electronic medical records for all procedures/Xrays and details which were not copied into this note but were reviewed prior to creation of Jose De Jesus Frederick MD

## 2017-06-23 NOTE — PROGRESS NOTES
At this time rehab placement pending facility requesting updated PT notes pt request is for [facility]  PCP Verified by CM: Yes  Palliative Care Consult (Criteria: CHF and RRAT>21): No  Reason for No Palliative Care Consult: Other (see comment)  Mode of Transport at Discharge: S  Transition of Care Consult (CM Consult): SNF  [address] [place] [address] Road: No  Partner SNF: Yes  Discharge Durable Medical Equipment: No  Health Maintenance Reviewed: Yes  Physical Therapy Consult: Yes  Occupational Therapy Consult: Yes  Speech Therapy Consult: No  Current Support Network: [address] [place] [address] ()  Confirm Follow Up Transport: Self  Plan discussed with Pt/Family/Caregiver: Yes  Freedom of Choice Offered: Yes  Discharge Location  Discharge Placement: Skilled nursing facility prior to pt leaving and notify family.

## 2017-06-23 NOTE — PROGRESS NOTES
Progress Note    Patient: Kashmir Barton MRN: [id number]  SSN: [id number]    YOB: 1940  Age: 64 y.o.   Sex: female      Subjective:     Post-Operative Day: 1 Status Post  ORIF left Hip fracture  Systemic or Specific Complaints:No Complaints    Objective:     Patient Vitals for the past 24 hrs:   BP Temp Pulse Resp SpO2   06/23/17 0804 119/65 97.4 °F (36.3 °C) 78 16 -   06/23/17 0606 96/44 97.3 °F (36.3 °C) 99 16 97 %   06/23/17 0540 102/46 97.3 °F (36.3 °C) 72 16 100 %   06/23/17 0510 94/52 97.2 °F (36.2 °C) 83 16 100 %   06/23/17 0450 103/47 97.2 °F (36.2 °C) 94 19 -   06/23/17 0357 102/47 98 °F (36.7 °C) 100 16 100 %   06/23/17 0338 122/70 99.6 °F (37.6 °C) (!) 106 16 100 %   06/23/17 0101 111/50 97.4 °F (36.3 °C) 97 16 100 %   06/23/17 0000 120/65 97.4 °F (36.3 °C) 100 16 100 %   06/22/17 2305 150/62 97.6 °F (36.4 °C) (!) 111 16 100 %   06/22/17 2237 123/59 97.6 °F (36.4 °C) (!) 107 16 100 %   06/22/17 2215 130/66 97.5 °F (36.4 °C) (!) 109 16 100 %   06/22/17 2210 133/62 97 °F (36.1 °C) (!) 111 15 100 %   06/22/17 2156 - - - - 100 %   06/22/17 2154 - - (!) 109 14 -   06/22/17 2153 - - (!) 113 16 100 %   06/22/17 2152 - - (!) 111 19 -   06/22/17 2151 - - (!) 113 16 -   06/22/17 2150 142/81 - (!) 112 18 -   06/22/17 2149 - - (!) 112 17 -   06/22/17 2148 - - (!) 110 15 -   06/22/17 2147 - - (!) 112 19 -   06/22/17 2146 - - (!) 110 15 -   06/22/17 2145 139/79 - (!) 104 15 -   06/22/17 2144 - - (!) 108 17 95 %   06/22/17 2142 - - (!) 109 16 96 %   06/22/17 2135 (!) 142/109 - (!) 101 18 -   06/22/17 2130 141/84 - (!) 108 18 -   06/22/17 2125 143/71 - (!) 108 19 -   06/22/17 2120 135/78 - (!) 106 20 -   06/22/17 2115 140/85 - (!) 108 20 -   06/22/17 2110 137/64 - (!) 108 17 100 %   06/22/17 2109 - - (!) 108 17 98 %   06/22/17 2105 135/77 - (!) 109 16 90 %   06/22/17 2100 130/69 - (!) 106 16 100 %   06/22/17 2055 136/70 - (!) 105 19 (!) 86 %   06/22/17 2050 134/74 - (!) 104 15 100 %   06/22/17 2045 126/71 - (!) 105 17 94 %   06/22/17 2044 - - (!) 104 17 100 %   06/22/17 2043 - - (!) 101 15 100 %   06/22/17 2042 - - (!) 106 16 98 %   06/22/17 2041 - - (!) 106 17 100 %   06/22/17 2040 112/51 - (!) 103 14 90 %   06/22/17 2039 - - (!) 102 16 91 %   06/22/17 2038 - - (!) 103 15 (!) 88 %   06/22/17 2037 - - (!) 104 16 (!) 79 %   06/22/17 2036 - - (!) 104 15 (!) 84 %   06/22/17 2035 119/60 - (!) 104 16 99 %   06/22/17 2034 - - (!) 104 18 100 %   06/22/17 2033 - - (!) 105 17 100 %   06/22/17 2032 - - (!) 104 19 -   06/22/17 2031 - - (!) 103 18 96 %   06/22/17 2030 - - (!) 104 19 95 %   06/22/17 2029 - - (!) 106 19 -   06/22/17 2028 - - (!) 103 18 -   06/22/17 2027 - - (!) 103 17 -   06/22/17 2026 - - (!) 104 16 -   06/22/17 2025 122/63 - (!) 104 20 -   06/22/17 2024 - - (!) 101 17 -   06/22/17 2023 - - 99 16 -   06/22/17 2022 - - 99 16 -   06/22/17 2021 - - 99 17 -   06/22/17 2020 109/58 - 99 17 -   06/22/17 2019 - - 99 17 - 06/22/17 2018 - - 99 16 - 06/22/17 2017 - - 99 17 - 06/22/17 2016 - - 99 16 - 06/22/17 2015 107/59 - 98 17 - 06/22/17 2014 - - 97 17 -   06/22/17 2013 - - 98 17 - 06/22/17 2012 - - 97 16 - 06/22/17 2011 - - 98 15 -   06/22/17 2010 99/56 - 98 16 100 %   06/22/17 2009 - - 97 15 -   06/22/17 2008 - - 98 15 -   06/22/17 2007 - - 98 16 -   06/22/17 2006 - - 99 17 -   06/22/17 2005 97/53 - (!) 104 16 100 %   06/22/17 2004 - - (!) 104 17 -   06/22/17 2003 - - (!) 101 16 -   06/22/17 2002 - - (!) 102 20 -   06/22/17 2001 - - 98 18 -   06/22/17 2000 (!) 84/44 - 96 15 100 %   06/22/17 1947 - - 90 13 - 06/22/17 1946 - - 91 13 - 06/22/17 1945 99/62 - 91 13 100 %   06/22/17 1944 - - 96 14 - 06/22/17 1943 - - 87 15 -   06/22/17 1942 - - 94 14 - 06/22/17 1941 - - 91 13 - 06/22/17 1940 106/63 - 89 13 100 %   06/22/17 1939 - - 90 14 - 06/1938 - - 92 14 - 06/22/17 1937 - - 90 14 - 06/22/17 1935 114/62 - 89 15 -   06/22/17 1932 - - 93 16 - 06/22/17 1931 - - 91 14 - 06/22/17 1930 99/64 - 91 15 -   06/22/17 1929 - - 89 15 -   06/22/17 1928 - - 89 15 -   06/22/17 1927 135/78 - 88 15 100 %   06/22/17 1926 - - 87 15 100 %   06/22/17 1925 135/78 - 85 15 100 %   06/22/17 1924 - - 82 16 -   06/22/17 1923 - - 78 17 -   06/22/17 1922 (!) 87/62 - 93 17 -   06/22/17 1921 - - 92 16 -   06/22/17 1920 (!) 78/53 - 95 16 -   06/22/17 1919 - - 93 17 -   06/22/17 1918 (!) 72/56 - 93 19 -   06/22/17 1917 (!) 87/48 - 95 19 (!) 79 %   06/22/17 1916 - - 95 19 -   06/22/17 1915 (!) 70/54 - 93 22 (!) 85 %   06/22/17 1914 - - 94 17 -   06/22/17 1913 (!) 66/42 - 97 19 (!) 78 %   06/22/17 1912 - - 96 16 -   06/22/17 1911 135/78 97 °F (36.1 °C) 95 16 100 %   06/22/17 1650 153/85 98.7 °F (37.1 °C) 81 - 100 %   06/22/17 1329 123/66 - 81 - -   06/22/17 1321 (!) 128/98 98.5 °F (36.9 °C) 85 18 100 %   06/22/17 1309 131/65 98.7 °F (37.1 °C) 84 18 100 %   06/22/17 1230 - 98.1 °F (36.7 °C) 81 16 100 %   06/22/17 1200 - 98.8 °F (37.1 °C) 88 - 100 %   06/22/17 1130 108/60 98.7 °F (37.1 °C) 80 18 100 %   06/22/17 1103 122/54 98.5 °F (36.9 °C) 77 18 100 %   06/22/17 1044 123/59 98 °F (36.7 °C) 80 16 100 %   06/22/17 1039 113/55 98.8 °F (37.1 °C) 70 18 100 %       General: fatigued, no distress   Dressing: Clean and dry   Neurovascular: Grossly intact distally   Hip Exam: Dressing CDI   DVT Exam: No evidence of DVT seen on physical exam.     Data Review    Labs: Results:       Chemistry Recent Labs      06/23/17   0217  06/23/17   0003  06/22/17   1700  06/22/17   0558   GLU  211*  209*  138*  130*   NA  132*  131*  133*  130*   K  3.1*  3.2*  4.0  4.9   CL  102  102  101  100   CO2  19*  19*  18*  19*   BUN  13  14  16  20*   CREA  1.37*  1.32*  1.32*  1.43*   CA  6.9*  6.9*  8.1*  7.7*   AGAP  11  10  14  11      CBC w/Diff Recent Labs      06/23/17   0217  06/23/17   0003  06/22/17   1700  06/22/17   0651  06/21/17   0230   WBC  29.9*   --    --   19.4*  22.7*   RBC  1.84*   --    --   1.89*  2.71*   HGB  6.2*  7.0*  10.8*  6.9* 9.8*   HCT  17.7*  19.7*  30.9*  19.4*  27.6*   PLT  41*   --    --   83*  147      Coagulation Recent Labs      06/21/17   0230   PTP  14.4   INR  1.2   APTT  29.3               Assessment:     Status Post  ORIF of the Hip. Complications: blood loss anemia  Transfusion 2 unit Heme ONc following     Plan:      Mobilize with PT TTWB on left standing tranfers and OOB to chair activity  Continued fall precautions  For discharge recommend continued partial weight bearing 25%   DVT prophylaxis if tolerated with Lovenox medical team to review  Follow up in 2 weeks with xray of left femur in office

## 2017-06-23 NOTE — PROGRESS NOTES
2210: Pt arrived to the unit. Appears to be in stable condition. No severe distress noted. Pt confused.  and personal sitter at bedside. Will monitor. 2300:  left, personal aid at bedside. Pt pulling at tubes. Reoriented to location and situation. Will monitor. 0100: Pt with c/o pain. Morphine given. Will monitor. 0400: Critical results reported to Dr. Carmen Johnston critical H&H. Orders to transfuse the 2 units previously ordered. 46: First unit of blood started at this time. No distress noted. Pt stable. Will monitor. 0740: Blood transfusion completed at this time. No distress noted. Will monitor.

## 2017-06-23 NOTE — PROGRESS NOTES
1100. Pt in bed,  at bedside. No sign of distress noted.   1400. Pt refused blood transfusion, wants to wait on  to come back to review the situation, she stated. She stated that he already got blood this morning and wants to wait.agreed.  1600.  bck in room, agreed to transfuse.  1730. Blood transfusing, no sign of reaction noted.  remained at bedside. 1915. Bedside and Verbal shift change report given to Huan Fagan RN (oncoming nurse) by Blu Ledesma RN   (offgoing nurse). Report included the following information SBAR, ED Summary, Procedure Summary, MAR and Recent Results.

## 2017-06-23 NOTE — PROGRESS NOTES
Phone: [phone]  Paging : [phone]      Hematology / Oncology Progress Note    Admit Date: 6/21/2017    Assessment:     Hem/Onc Issues:  CMML, on single agent hydrea for count control  Reasons for Admission: Left hip pain post fall,  Fracture, had surgery 6/22/2017  Other Active Issues:    · Anemia and thrombocytopenia due to CMML and some blood loss from surgery 6/22/2017    Principal Problem:    Closed left hip fracture ([address] (6/21/2017)    Active Problems:    Myelodysplasia (myelodysplastic syndrome) (HCC) ()      Crohn's disease (HCC) ()      Hypertension ()      CKD (chronic kidney disease) stage 3, GFR 30-59 ml/min ()      Overview: stage 3   from stone      Hyponatremia (6/21/2017)      Dementia (6/21/2017)      Anemia (6/22/2017)        Plan:     · Transfuse 2 units of blood today for Hgb 6.2  · Will also transfuse 1 platelet for platelet count 40 given post op state  · Will give lasix in between units  · Post op recover  · Other management per Surgery and Medicine team  · We will follow as needed      Subjectives: Had surgery. No bleeding.      Objectives:      VITAL SIGNS:   Patient Vitals for the past 24 hrs:   BP Temp Pulse Resp SpO2   06/23/17 0804 119/65 97.4 °F (36.3 °C) 78 16 -   06/23/17 0606 96/44 97.3 °F (36.3 °C) 99 16 97 %   06/23/17 0540 102/46 97.3 °F (36.3 °C) 72 16 100 %   06/23/17 0510 94/52 97.2 °F (36.2 °C) 83 16 100 %   06/23/17 0450 103/47 97.2 °F (36.2 °C) 94 19 -   06/23/17 0357 102/47 98 °F (36.7 °C) 100 16 100 %   06/23/17 0338 122/70 99.6 °F (37.6 °C) (!) 106 16 100 %   06/23/17 0101 111/50 97.4 °F (36.3 °C) 97 16 100 %   06/23/17 0000 120/65 97.4 °F (36.3 °C) 100 16 100 %   06/22/17 2305 150/62 97.6 °F (36.4 °C) (!) 111 16 100 %   06/22/17 2237 123/59 97.6 °F (36.4 °C) (!) 107 16 100 %   06/22/17 2215 130/66 97.5 °F (36.4 °C) (!) 109 16 100 %   06/22/17 2210 133/62 97 °F (36.1 °C) (!) 111 15 100 %   06/22/17 2156 - - - - 100 %   06/22/17 2154 - - (!) 109 14 - 06/22/17 2153 - - (!) 113 16 100 %   06/22/17 2152 - - (!) 111 19 -   06/22/17 2151 - - (!) 113 16 -   06/22/17 2150 142/81 - (!) 112 18 -   06/22/17 2149 - - (!) 112 17 -   06/22/17 2148 - - (!) 110 15 -   06/22/17 2147 - - (!) 112 19 -   06/22/17 2146 - - (!) 110 15 -   06/22/17 2145 139/79 - (!) 104 15 -   06/22/17 2144 - - (!) 108 17 95 %   06/22/17 2142 - - (!) 109 16 96 %   06/22/17 2135 (!) 142/109 - (!) 101 18 -   06/22/17 2130 141/84 - (!) 108 18 -   06/22/17 2125 143/71 - (!) 108 19 -   06/22/17 2120 135/78 - (!) 106 20 -   06/22/17 2115 140/85 - (!) 108 20 -   06/22/17 2110 137/64 - (!) 108 17 100 %   06/22/17 2109 - - (!) 108 17 98 %   06/22/17 2105 135/77 - (!) 109 16 90 %   06/22/17 2100 130/69 - (!) 106 16 100 %   06/22/17 2055 136/70 - (!) 105 19 (!) 86 %   06/22/17 2050 134/74 - (!) 104 15 100 %   06/22/17 2045 126/71 - (!) 105 17 94 %   06/22/17 2044 - - (!) 104 17 100 %   06/22/17 2043 - - (!) 101 15 100 %   06/22/17 2042 - - (!) 106 16 98 %   06/22/17 2041 - - (!) 106 17 100 %   06/22/17 2040 112/51 - (!) 103 14 90 %   06/22/17 2039 - - (!) 102 16 91 %   06/22/17 2038 - - (!) 103 15 (!) 88 %   06/22/17 2037 - - (!) 104 16 (!) 79 %   06/22/17 2036 - - (!) 104 15 (!) 84 %   06/22/17 2035 119/60 - (!) 104 16 99 %   06/22/17 2034 - - (!) 104 18 100 %   06/22/17 2033 - - (!) 105 17 100 %   06/22/17 2032 - - (!) 104 19 -   06/22/17 2031 - - (!) 103 18 96 %   06/22/17 2030 - - (!) 104 19 95 %   06/22/17 2029 - - (!) 106 19 -   06/22/17 2028 - - (!) 103 18 -   06/22/17 2027 - - (!) 103 17 -   06/22/17 2026 - - (!) 104 16 -   06/22/17 2025 122/63 - (!) 104 20 -   06/22/17 2024 - - (!) 101 17 -   06/22/17 2023 - - 99 16 -   06/22/17 2022 - - 99 16 -   06/22/17 2021 - - 99 17 -   06/22/17 2020 109/58 - 99 17 -   06/22/17 2019 - - 99 17 -   06/22/17 2018 - - 99 16 - 06/22/17 2017 - - 99 17 -   06/22/17 2016 - - 99 16 -   06/22/17 2015 107/59 - 98 17 -   06/22/17 2014 - - 97 17 -   06/22/17 2013 - - 98 17 -   06/22/17 2012 - - 97 16 -   06/22/17 2011 - - 98 15 -   06/22/17 2010 99/56 - 98 16 100 %   06/22/17 2009 - - 97 15 -   06/22/17 2008 - - 98 15 -   06/22/17 2007 - - 98 16 -   06/22/17 2006 - - 99 17 -   06/22/17 2005 97/53 - (!) 104 16 100 %   06/22/17 2004 - - (!) 104 17 -   06/22/17 2003 - - (!) 101 16 -   06/22/17 2002 - - (!) 102 20 -   06/22/17 2001 - - 98 18 -   06/22/17 2000 (!) 84/44 - 96 15 100 %   06/22/17 1947 - - 90 13 -   06/22/17 1946 - - 91 13 -   06/22/17 1945 99/62 - 91 13 100 %   06/22/17 1944 - - 96 14 -   06/22/17 1943 - - 87 15 -   06/22/17 1942 - - 94 14 -   06/22/17 1941 - - 91 13 -   06/22/17 1940 106/63 - 89 13 100 %   06/22/17 1939 - - 90 14 -   06/1938 - - 92 14 -   06/22/17 1937 - - 90 14 -   06/22/17 1935 114/62 - 89 15 -   06/22/17 1932 - - 93 16 -   06/22/17 1931 - - 91 14 -   06/22/17 1930 99/64 - 91 15 -   06/22/17 1929 - - 89 15 -   06/22/17 1928 - - 89 15 -   06/22/17 1927 135/78 - 88 15 100 %   06/22/17 1926 - - 87 15 100 %   06/22/17 1925 135/78 - 85 15 100 %   06/22/17 1924 - - 82 16 -   06/22/17 1923 - - 78 17 -   06/22/17 1922 (!) 87/62 - 93 17 -   06/22/17 1921 - - 92 16 -   06/22/17 1920 (!) 78/53 - 95 16 -   06/22/17 1919 - - 93 17 -   06/22/17 1918 (!) 72/56 - 93 19 -   06/22/17 1917 (!) 87/48 - 95 19 (!) 79 %   06/22/17 1916 - - 95 19 -   06/22/17 1915 (!) 70/54 - 93 22 (!) 85 %   06/22/17 1914 - - 94 17 -   06/22/17 1913 (!) 66/42 - 97 19 (!) 78 %   06/22/17 1912 - - 96 16 -   06/22/17 1911 135/78 97 °F (36.1 °C) 95 16 100 %   06/22/17 1650 153/85 98.7 °F (37.1 °C) 81 - 100 %   06/22/17 1329 123/66 - 81 - -   06/22/17 1321 (!) 128/98 98.5 °F (36.9 °C) 85 18 100 %   06/22/17 1309 131/65 98.7 °F (37.1 °C) 84 18 100 %   06/22/17 1230 - 98.1 °F (36.7 °C) 81 16 100 %   06/22/17 1200 - 98.8 °F (37.1 °C) 88 - 100 %   06/22/17 1130 108/60 98.7 °F (37.1 °C) 80 18 100 %   06/22/17 1103 122/54 98.5 °F (36.9 °C) 77 18 100 %   06/22/17 1044 123/59 98 °F (36.7 °C) 80 16 100 %   06/22/17 1039 113/55 98.8 °F (37.1 °C) 70 18 100 %     GENERAL: normal appearance, no acute distress. HEENT: conjunctivae normal, eyelids normal, PERRLA, anicteric, external ears and nose normal, hearing grossly normal.   NECK: supple, no masses. LUNG: breathing comfortably, lungs clear to auscultation and percussion. CARDIOVASCULAR: normal heart sounds, heart rhythm regular, no peripheral edema. ABDOMEN: soft. bowel sounds normal, non-tender non distension, no hepatosplenomegaly   PELVIS: pelvic exam deferred. RECTUM: rectal exam deferred. LYMPH NODES: no cervical adenopathy, no axillary adenopathy, no supraclavicular adenopathy, no infraclavicular adenopathy. SKIN: no rash, no petechiae, no ecchymosis, no pallor, no cutaneous nodules. MUSCULOSKELETAL: normal muscle strength. NEUROLOGIC: no focal motor deficit, normal gait, no abnormal mental status. PSYCHIATRIC: oriented to person, time and place, mood and affect appropriate.       Medications:      Current Medications:    Current Facility-Administered Medications   Medication Dose Route Frequency    0.9% sodium chloride infusion 250 mL  250 mL IntraVENous PRN    0.9% sodium chloride infusion 250 mL  250 mL IntraVENous PRN    0.9% sodium chloride infusion 250 mL  250 mL IntraVENous PRN    0.9% sodium chloride infusion 250 mL  250 mL IntraVENous PRN    ceFAZolin (ANCEF) 2g IVPB in 50 mL D5W  2 g IntraVENous Q8H    sodium chloride (NS) flush 5-10 mL  5-10 mL IntraVENous Q8H    sodium chloride (NS) flush 5-10 mL  5-10 mL IntraVENous PRN    acetaminophen (TYLENOL) tablet 650 mg  650 mg Oral Q4H PRN    ondansetron (ZOFRAN) injection 4 mg  4 mg IntraVENous Q4H PRN    ferrous sulfate tablet 325 mg  1 Tab Oral DAILY WITH BREAKFAST    enoxaparin (LOVENOX) injection 30 mg  30 mg SubCUTAneous DAILY    0.9% sodium chloride infusion  100 mL/hr IntraVENous CONTINUOUS    acetaminophen (TYLENOL) tablet 650 mg  650 mg Oral Q6H PRN    multivitamin, tx-iron-ca-min (THERA-M w/ IRON) tablet 1 Tab  1 Tab Oral DAILY    sodium chloride tablet 1 g  1 g Oral DAILY    latanoprost (XALATAN) 0.005 % ophthalmic solution 1 Drop  1 Drop Both Eyes QHS    ondansetron (ZOFRAN) injection 4 mg  4 mg IntraVENous Q6H PRN    diazePAM (VALIUM) tablet 2 mg  2 mg Oral Q6H PRN    donepezil (ARICEPT) tablet 5 mg  5 mg Oral QHS    famotidine (PEPCID) tablet 20 mg  20 mg Oral BID    morphine injection 1 mg  1 mg IntraVENous Q3H PRN    ketorolac (TORADOL) injection 30 mg  30 mg IntraVENous Q6H PRN       Allergies:     Allergies   Allergen Reactions    Atorvastatin Hives    Levofloxacin Nausea and Vomiting    Losartan Drowsiness    Ramipril Other (comments)    Simvastatin Shortness of Breath    Statins-Hmg-Coa Reductase Inhibitors Other (comments)     Body aches         Ancillary Study Results:      Laboratory:    Recent Results (from the past 24 hour(s))   METABOLIC PANEL, BASIC    Collection Time: 06/22/17  5:00 PM   Result Value Ref Range    Sodium 133 (L) 136 - 145 mmol/L    Potassium 4.0 3.5 - 5.5 mmol/L    Chloride 101 100 - 108 mmol/L    CO2 18 (L) 21 - 32 mmol/L    Anion gap 14 3.0 - 18 mmol/L    Glucose 138 (H) 74 - 99 mg/dL    BUN 16 7.0 - 18 MG/DL    Creatinine 1.32 (H) 0.6 - 1.3 MG/DL    BUN/Creatinine ratio 12 12 - 20      GFR est AA 47 (L) >60 ml/min/1.73m2    GFR est non-AA 39 (L) >60 ml/min/1.73m2    Calcium 8.1 (L) 8.5 - 10.1 MG/DL   HGB & HCT    Collection Time: 06/22/17  5:00 PM   Result Value Ref Range    HGB 10.8 (L) 12.0 - 16.0 g/dL    HCT 30.9 (L) 35.0 - 45.0 %   POC 6 PLUS    Collection Time: 06/22/17  8:11 PM   Result Value Ref Range    Sodium,  (L) 136 - 145 MMOL/L    Potassium, POC 4.0 3.5 - 5.5 MMOL/L    Chloride,  100 - 108 MMOL/L    BUN, POC 14 7 - 18 MG/DL    Glucose,  (H) 74 - 106 MG/DL    Hematocrit, POC 26 (L) 36 - 49 %    Hemoglobin, POC 8.8 (L) 12 - 16 G/DL   METABOLIC PANEL, BASIC    Collection Time: 06/23/17 12:03 AM   Result Value Ref Range    Sodium 131 (L) 136 - 145 mmol/L    Potassium 3.2 (L) 3.5 - 5.5 mmol/L    Chloride 102 100 - 108 mmol/L    CO2 19 (L) 21 - 32 mmol/L    Anion gap 10 3.0 - 18 mmol/L    Glucose 209 (H) 74 - 99 mg/dL    BUN 14 7.0 - 18 MG/DL    Creatinine 1.32 (H) 0.6 - 1.3 MG/DL    BUN/Creatinine ratio 11 (L) 12 - 20      GFR est AA 47 (L) >60 ml/min/1.73m2    GFR est non-AA 39 (L) >60 ml/min/1.73m2    Calcium 6.9 (L) 8.5 - 10.1 MG/DL   HGB & HCT    Collection Time: 06/23/17 12:03 AM   Result Value Ref Range    HGB 7.0 (L) 12.0 - 16.0 g/dL    HCT 19.7 (L) 35.0 - 71.1 %   METABOLIC PANEL, BASIC    Collection Time: 06/23/17  2:17 AM   Result Value Ref Range    Sodium 132 (L) 136 - 145 mmol/L    Potassium 3.1 (L) 3.5 - 5.5 mmol/L    Chloride 102 100 - 108 mmol/L    CO2 19 (L) 21 - 32 mmol/L    Anion gap 11 3.0 - 18 mmol/L    Glucose 211 (H) 74 - 99 mg/dL    BUN 13 7.0 - 18 MG/DL    Creatinine 1.37 (H) 0.6 - 1.3 MG/DL    BUN/Creatinine ratio 9 (L) 12 - 20      GFR est AA 45 (L) >60 ml/min/1.73m2    GFR est non-AA 37 (L) >60 ml/min/1.73m2    Calcium 6.9 (L) 8.5 - 10.1 MG/DL   CBC W/O DIFF    Collection Time: 06/23/17  2:17 AM   Result Value Ref Range    WBC 29.9 (H) 4.6 - 13.2 K/uL    RBC 1.84 (L) 4.20 - 5.30 M/uL    HGB 6.2 (L) 12.0 - 16.0 g/dL    HCT 17.7 (LL) 35.0 - 45.0 %    MCV 96.2 74.0 - 97.0 FL    MCH 33.7 24.0 - 34.0 PG    MCHC 35.0 31.0 - 37.0 g/dL    RDW 16.4 (H) 11.6 - 14.5 %    PLATELET 41 (L) 270 - 420 K/uL    MPV 12.2 (H) 9.2 - 11.8 FL         Radiology:   No results found. Enzo Awad.  Edmundo Arteaga MD, PhD, [address]  Phone: [phone]  Pager: [phone]

## 2017-06-23 NOTE — PROGRESS NOTES
1930: Care assumed of pt at this time. o distress noted.  at bedside. Will continue to monitor pt.     2200: Pt given nightly meds. Tolerated well.  went home. Personal aid at bedside. Will monitor. 0000: Pt pulled out IV. Attempted to establish a new IV site. Pt refusing at this time. Will retry. 0100: New IV site accessed. Pt tolerated well. Person aid at bedside. 0400: Pt awake, no distress noted. Aid remains at bedside. 0600: Pt  at bedside. Pt appears to be in stable condition.  Will monitor

## 2017-06-23 NOTE — PROGRESS NOTES
Problem: Mobility Impaired (Adult and Pediatric)  Goal: *Acute Goals and Plan of Care (Insert Text)  Physical Therapy Goals  Initiated 6/23/2017 and to be accomplished within 3-5 day(s)  1. Patient will move from supine <> sit with Mod-Min A in prep for out of bed activity and change of position. 2. Patient will perform sit<> stand with Mod A with rolling walker in prep for transfers/ambulation. 3. Patient will transfer from bed <> chair with Max A with rolling walker for time up in chair for completion of ADL activity. 4. Patient will ambulate 20-30 feet with LRAD/ Max A for increase functional mobility. Outcome: Progressing Towards Goal  PHYSICAL THERAPY EVALUATION     Patient: Alejandra Ely (88 y.o. female)  Date: 6/23/2017  Primary Diagnosis: Closed left hip fracture (HCC)  FRACTURED LEFT HIP  Procedure(s) (LRB):  INTRAMEDULLARY NAILING LEFT HIP WITH C-ARM  (Left) 1 Day Post-Op   Precautions:  Fall, TTWB      ASSESSMENT :  Based on the objective data described below, the patient presents with decrease bed mobility, transfer, gait. Pt was seen in semi-plata position prior to treatment w/ IV, colestomy bag, and regan catheter, grand-daughter present in the room. OT was in the room during assessment. Pt is oriented to self and appears confused during session. Pt's BP while sitting on the EOB was assessed at 107/51, standing 79/49, and supine 97/52. Once pt was sitting on the EOB, pt complained of increase dizziness. Once dizziness decreased, pt required Max A x 2 to perform sit<>stand transfer activity and needed assistance to maintain W/Jolly precautions. Pt was only able to stand secondary to activity tolerance, increase dizziness, and pain. Pt was transferred back to bed after treatment session and required new sheets secondary to pt's bed soiled. Pt was left with call bell and phone at [facility], pts spouse entered the room upon exiting, nurse made aware of the above information.      Patient will benefit from skilled intervention to address the above impairments. Patients rehabilitation potential is considered to be Fair  Factors which may influence rehabilitation potential include:   [ ]         None noted  [X]         Mental ability/status  [X]         Medical condition  [X]         Home/family situation and support systems  [X]         Safety awareness  [X]         Pain tolerance/management  [ ]         Other:        PLAN :  Recommendations and Planned Interventions:  [X]           Bed Mobility Training             [ ]    Neuromuscular Re-Education  [X]           Transfer Training                   [ ]    Orthotic/Prosthetic Training  [X]           Gait Training                          [ ]    Modalities  [X]           Therapeutic Exercises          [ ]    Edema Management/Control  [X]           Therapeutic Activities            [ ]    Patient and Family Training/Education  [ ]           Other (comment):     Frequency/Duration: Patient will be followed by physical therapy once/ twice daily to address goals. Discharge Recommendations: Rehab  Further Equipment Recommendations for Discharge: rolling walker       SUBJECTIVE:   Patient stated My leg hurts, try not to move it.       OBJECTIVE DATA SUMMARY:       Past Medical History:   Diagnosis Date    Autoimmune disease ([facility]       Chron's    Calculus of kidney      Chronic kidney disease       stage 3   from stone    Chronic kidney disease      Closed left hip fracture ([facility] [address] 6/21/2017    Crohn's disease ([facility] [address] [facility]      Epiretinal membrane      Glaucoma      Glaucoma      Gout      Hearing loss      Hernia of abdominal cavity      History of esophagogastroduodenoscopy (EGD)      Hypertension 2000    Ileostomy present ([address]      Memory problem      Menopause      Myelodysplasia (myelodysplastic syndrome) ([address]      Osteopenia       Past Surgical History:   Procedure Laterality Date    HX ABDOMINAL LAPAROSCOPY        HX APPENDECTOMY        HX BREAST BIOPSY        HX CATARACT REMOVAL        HX COLECTOMY        HX GYN         pelvic cysts    HX OTHER SURGICAL         ilieostomy    HX OTHER SURGICAL         bone biopsy    HX SHOULDER ARTHROSCOPY         right    HX TONSILLECTOMY         Barriers to Learning/Limitations: yes;  cognitive, physical and altered mental status (i.e.Sedation, Confusion)  Compensate with: Verbal Cues and Tactile Cues  Prior Level of Function/Home Situation:   Home Situation  Home Environment: Apartment  # Steps to Enter: 0 (Elevator)  One/Two Story Residence: Other (Comment) (3rd level)  Living Alone: No  Support Systems: Spouse/Significant Other/Partner  Patient Expects to be Discharged to[de-identified] Apartment  Current DME Used/Available at Home: Shower chair  Tub or Shower Type: Shower  Critical Behavior:  Neurologic State: Alert;Confused  Orientation Level: Disoriented to place; Disoriented to situation;Disoriented to time;Oriented to person  Safety/Judgement: Decreased awareness of need for assistance;Decreased awareness of need for safety;Decreased insight into deficits  Psychosocial  Patient Behaviors: Calm;Confused  Purposeful Interaction: Yes  Pt Identified Daily Priority: Clinical issues (comment)  Caritas Process: Nurture loving kindness  Caring Interventions: Reassure  Skin Condition/Temp: Dry;Warm  Skin Integrity: Incision (comment); Wound (add Wound LDA) (left hip, left elbow)  Skin Integumentary  Skin Color: Appropriate for ethnicity  Skin Condition/Temp: Dry;Warm  Skin Integrity: Incision (comment); Wound (add Wound LDA) (left hip, left elbow)  Turgor: Epidermis thin w/ loss of subcut tissue  Strength:    Strength: Generally decreased, functional  Range Of Motion:  AROM: Within functional limits  Functional Mobility:  Bed Mobility:  Rolling: Maximum assistance;Assist x2  Supine to Sit: Maximum assistance;Assist x2  Sit to Supine: Maximum assistance;Assist x2  Scooting:  Total assistance  Transfers:  Sit to Stand: Maximum assistance; Additional time;Assist x2  Stand to Sit: Maximum assistance; Additional time;Assist x2  Balance:   Sitting: Intact; With support  Standing: Impaired; With support  Standing - Static: Fair  Standing - Dynamic : Poor  Pain:  Pain Scale 1: Numeric (0 - 10)  Pain Intensity 1: 2  Pain Location 1: Hip  Pain Orientation 1: Left  Activity Tolerance:   Poor  Please refer to the flowsheet for vital signs taken during this treatment. After treatment:   [ ]         Patient left in no apparent distress sitting up in chair  [X]         Patient left in no apparent distress in bed  [X]         Call bell left within reach  [X]         Nursing notified  [X]         Caregiver present  [ ]         Bed alarm activated      COMMUNICATION/EDUCATION:   [X]         Fall prevention education was provided and the patient/caregiver indicated understanding. [X]         Patient/family have participated as able in goal setting and plan of care. [X]         Patient/family agree to work toward stated goals and plan of care. [X]         Patient understands intent and goals of therapy, but is neutral about his/her participation. [ ]         Patient is unable to participate in goal setting and plan of care.      Thank you for this referral.  Xiomara Bowman, PT   Time Calculation: 46 mins

## 2017-06-23 NOTE — PERIOP NOTES
TRANSFER - OUT REPORT:    Verbal report given to [facility] RN (name) on Rashad Ponce  being transferred to 3  (unit) for routine progression of care       Report consisted of patients Situation, Background, Assessment and   Recommendations(SBAR). Information from the following report(s) SBAR, Kardex, Procedure Summary and Intake/Output was reviewed with the receiving nurse. Lines:   Peripheral IV 06/21/17 Right Wrist (Active)   Site Assessment Clean, dry, & intact 6/22/2017  7:11 PM   Phlebitis Assessment 0 6/22/2017  7:11 PM   Infiltration Assessment 0 6/22/2017  7:11 PM   Dressing Status Clean, dry, & intact 6/22/2017  7:11 PM   Dressing Type Transparent;Tape 6/22/2017  7:11 PM   Hub Color/Line Status Infusing 6/22/2017  7:11 PM   Action Taken Open ports on tubing capped 6/22/2017  4:50 PM   Alcohol Cap Used Yes 6/22/2017  4:50 PM        Opportunity for questions and clarification was provided.       Patient transported with:   O2 @ 2 liters  Registered Nurse

## 2017-06-23 NOTE — PROGRESS NOTES
Orders received. Chart reviewed. PT eval on hold per Nurse Malcolm Albert secondary to pt receiving pottassium at this time and Hgb low, will attempt PT eval when pt schedule allows.

## 2017-06-23 NOTE — ANESTHESIA POSTPROCEDURE EVALUATION
Post-Anesthesia Evaluation and Assessment    Patient: Mirela Jesus MRN: [id number]  SSN: [id number]    YOB: 1940  Age: 64 y.o. Sex: female       Cardiovascular Function/Vital Signs  Visit Vitals    /64    Pulse (!) 108    Temp 36.1 °C (97 °F)    Resp 17    Ht 4' 11\" (1.499 m)    Wt 47.3 kg (104 lb 4.8 oz)    SpO2 100%    BMI 21.07 kg/m2       Patient is status post spinal anesthesia for Procedure(s):  INTRAMEDULLARY NAILING LEFT HIP WITH C-ARM . Nausea/Vomiting: None    Postoperative hydration reviewed and adequate. Pain:  Pain Scale 1: Visual (06/22/17 2049)  Pain Intensity 1: 7 (06/22/17 2049)   Managed    Neurological Status:   Neuro (WDL): Exceptions to WDL (06/22/17 1945)  Neuro  Neurologic State: Drowsy (06/22/17 1945)  LUE Motor Response: Weak (06/22/17 1945)  LLE Motor Response: Weak (06/22/17 1945)  RUE Motor Response: Weak (06/22/17 1945)  RLE Motor Response: Weak (06/22/17 1945)   At baseline    Mental Status and Level of Consciousness: Arousable    Pulmonary Status:   O2 Device: Nasal cannula (06/22/17 1945)   Adequate oxygenation and airway patent    Complications related to anesthesia: None   Pt to be followed by hospitalist and going to cardiac telemetry floor for further monitoring      Post-anesthesia assessment completed.  No concerns    Signed By: Madelyn Dickerson CRNA     June 22, 2017

## 2017-06-23 NOTE — PROGRESS NOTES
At this time rehab placement pending at [facility], pt needs updated PT,OT eval once received will forward to facility

## 2017-06-23 NOTE — PERIOP NOTES
Verbal hand off at the bedside provided opportunity for questions, vital signs stable and documented. The patients  and sitter met patient in room.

## 2017-06-23 NOTE — PROGRESS NOTES
TRANSFER - IN REPORT:    Verbal report received from [facility]) on Andrea Comfort  being received from [facility]) for routine progression of care      Report consisted of patients Situation, Background, Assessment and   Recommendations(SBAR). Information from the following report(s) SBAR, Kardex and MAR was reviewed with the receiving nurse. Opportunity for questions and clarification was provided. Assessment completed upon patients arrival to unit and care assumed.

## 2017-06-23 NOTE — WOUND CARE
Pt seen by wound care at request of bedside nurse to change surgical site dressings and dressings to abrasion of the left elbow and LLE from fall PTA. Dressings changed and pt tolerated well.

## 2017-06-24 LAB
ANION GAP BLD CALC-SCNC: 8 MMOL/L (ref 3–18)
ATRIAL RATE: 59 BPM
BLD PROD TYP BPU: NORMAL
BPU ID: NORMAL
BUN SERPL-MCNC: 15 MG/DL (ref 7–18)
BUN/CREAT SERPL: 10 (ref 12–20)
CALCIUM SERPL-MCNC: 7.5 MG/DL (ref 8.5–10.1)
CALCULATED P AXIS, ECG09: 54 DEGREES
CALCULATED R AXIS, ECG10: -52 DEGREES
CALCULATED T AXIS, ECG11: 81 DEGREES
CALLED TO:,BCALL1: NORMAL
CHLORIDE SERPL-SCNC: 103 MMOL/L (ref 100–108)
CO2 SERPL-SCNC: 20 MMOL/L (ref 21–32)
CREAT SERPL-MCNC: 1.54 MG/DL (ref 0.6–1.3)
DIAGNOSIS, 93000: NORMAL
ERYTHROCYTE [DISTWIDTH] IN BLOOD BY AUTOMATED COUNT: 16.7 % (ref 11.6–14.5)
GLUCOSE BLD STRIP.AUTO-MCNC: 103 MG/DL (ref 70–110)
GLUCOSE BLD STRIP.AUTO-MCNC: 111 MG/DL (ref 70–110)
GLUCOSE BLD STRIP.AUTO-MCNC: 131 MG/DL (ref 70–110)
GLUCOSE BLD STRIP.AUTO-MCNC: 88 MG/DL (ref 70–110)
GLUCOSE SERPL-MCNC: 120 MG/DL (ref 74–99)
HCT VFR BLD AUTO: 22 % (ref 35–45)
HGB BLD-MCNC: 7.8 G/DL (ref 12–16)
MCH RBC QN AUTO: 31.8 PG (ref 24–34)
MCHC RBC AUTO-ENTMCNC: 35.5 G/DL (ref 31–37)
MCV RBC AUTO: 89.8 FL (ref 74–97)
P-R INTERVAL, ECG05: 212 MS
PLATELET # BLD AUTO: 80 K/UL (ref 135–420)
PMV BLD AUTO: 10.2 FL (ref 9.2–11.8)
POTASSIUM SERPL-SCNC: 5.3 MMOL/L (ref 3.5–5.5)
Q-T INTERVAL, ECG07: 448 MS
QRS DURATION, ECG06: 104 MS
QTC CALCULATION (BEZET), ECG08: 443 MS
RBC # BLD AUTO: 2.45 M/UL (ref 4.2–5.3)
SODIUM SERPL-SCNC: 131 MMOL/L (ref 136–145)
STATUS OF UNIT,%ST: NORMAL
UNIT DIVISION, %UDIV: 0
VENTRICULAR RATE, ECG03: 59 BPM
WBC # BLD AUTO: 37.5 K/UL (ref 4.6–13.2)

## 2017-06-24 PROCEDURE — 74011250636 HC RX REV CODE- 250/636: Performed by: HOSPITALIST

## 2017-06-24 PROCEDURE — 97530 THERAPEUTIC ACTIVITIES: CPT

## 2017-06-24 PROCEDURE — 74011250637 HC RX REV CODE- 250/637: Performed by: ORTHOPAEDIC SURGERY

## 2017-06-24 PROCEDURE — 97110 THERAPEUTIC EXERCISES: CPT

## 2017-06-24 PROCEDURE — 82962 GLUCOSE BLOOD TEST: CPT

## 2017-06-24 PROCEDURE — 85027 COMPLETE CBC AUTOMATED: CPT | Performed by: FAMILY MEDICINE

## 2017-06-24 PROCEDURE — 80048 BASIC METABOLIC PNL TOTAL CA: CPT | Performed by: FAMILY MEDICINE

## 2017-06-24 PROCEDURE — 65660000000 HC RM CCU STEPDOWN

## 2017-06-24 PROCEDURE — 51798 US URINE CAPACITY MEASURE: CPT

## 2017-06-24 PROCEDURE — 77010033678 HC OXYGEN DAILY

## 2017-06-24 PROCEDURE — 74011250637 HC RX REV CODE- 250/637: Performed by: HOSPITALIST

## 2017-06-24 PROCEDURE — 36415 COLL VENOUS BLD VENIPUNCTURE: CPT | Performed by: FAMILY MEDICINE

## 2017-06-24 RX ADMIN — FAMOTIDINE 20 MG: 20 TABLET ORAL at 21:19

## 2017-06-24 RX ADMIN — LATANOPROST 1 DROP: 50 SOLUTION OPHTHALMIC at 21:19

## 2017-06-24 RX ADMIN — FERROUS SULFATE TAB 325 MG (65 MG ELEMENTAL FE) 325 MG: 325 (65 FE) TAB at 10:02

## 2017-06-24 RX ADMIN — FAMOTIDINE 20 MG: 20 TABLET ORAL at 10:02

## 2017-06-24 RX ADMIN — SODIUM CHLORIDE TAB 1 GM 1 G: 1 TAB at 10:02

## 2017-06-24 RX ADMIN — MULTIPLE VITAMINS W/ MINERALS TAB 1 TABLET: TAB at 10:02

## 2017-06-24 RX ADMIN — Medication 1 MG: at 02:17

## 2017-06-24 RX ADMIN — Medication 10 ML: at 15:36

## 2017-06-24 RX ADMIN — DONEPEZIL HYDROCHLORIDE 5 MG: 5 TABLET, FILM COATED ORAL at 21:19

## 2017-06-24 RX ADMIN — SODIUM CHLORIDE 100 ML/HR: 900 INJECTION, SOLUTION INTRAVENOUS at 15:35

## 2017-06-24 NOTE — PROGRESS NOTES
Progress Note      Patient: Lety Matthews               Sex: female          DOA: 6/21/2017         YOB: 1940      Age:  64 y.o.        LOS:  LOS: 3 days             Post op day 2    Vital Signs stable, Wound OK, with no significant drainage. NV intact. OK for rehab discharge when medically stable, planning for 6/26. Office visit in 1-2 weeks.

## 2017-06-24 NOTE — ROUTINE PROCESS
Bedside and Verbal shift change report given to Salome RN (oncoming nurse) by Tenzin Junior RN   (offgoing nurse). Report included the following information SBAR, Kardex and MAR.

## 2017-06-24 NOTE — PROGRESS NOTES
Daily Progress Note: 6/24/2017 1:37 PM   Admit Date: 6/21/2017    Patient seen in follow up for multiple medical problems as listed below:  Patient Active Problem List   Diagnosis Code    Myelodysplasia (myelodysplastic syndrome) ([facility] D46.9    Crohn's disease ([facility] K50.90    Hypertension I10    CKD (chronic kidney disease) stage 3, GFR 30-59 ml/min N18.3    Closed left hip fracture (HCC) S72.002A    Hyponatremia E87.1    Dementia F03.90    Anemia D64.9       Assesment     65 yo female with  CML, hyponatremia, hypokalemia, fell at home and fractured her left hip  Her surgery was deferred until 6/22 so her sodium and anemia + thrombocytopenia could be corrected. [facility] consulting. S/o L hip IM nailing 6/22 Dr Esther Benton.      Hyponatremia-resolving. Thought due to intake. No offending meds. Fluid restrict. Get Urine Na/ Osm. TSH low nl. Cortisol up. Post-op hypoxia - due to atelectasis. Wean off 02. Left hip fracture s/p repair-PT with ltd weight bearing,  platelets being low increase her bleeding risk-will go to SNF for d/c when ready  Dementia- mild -moderate  Hyperglycemia- accuchecks with SSI, check A1C, not know to be diabetic  Crohns with colostomy  CML -VOA consulting. Some reactive WBC elevation to 37K  Post-surgical blood loss anemia-2 units PRBC plus platelets 1/50  Hypokalemia-PO and IV Kcl repeleted  CKD 3    DVT Protocol Active: start Hep TID. Recommend daily lovenox for 14d  Code Status:  Full Code     Disposition: SNF when approved    Subjective:     CC: Syncope and Hip Pain    Interval History: overall improving. Hg holding. Awaiting placement she is working well with PT. Add studies for hypoNA.     ROS: 11 point ROS negative except for hip pain    Objective:     Visit Vitals    /72 (BP 1 Location: Left arm, BP Patient Position: At rest)    Pulse (!) 120    Temp 98.5 °F (36.9 °C)    Resp 14    Ht 4' 11\" (1.499 m)    Wt 47.3 kg (104 lb 4.8 oz)    SpO2 91%    BMI 21.07 kg/m2 Temp (24hrs), Av.1 °F (36.7 °C), Min:97.8 °F (36.6 °C), Max:98.9 °F (37.2 °C)        Intake/Output Summary (Last 24 hours) at 17 1337  Last data filed at 17 1006   Gross per 24 hour   Intake            512.1 ml   Output              200 ml   Net            312.1 ml       Gen: AOx1-2, NAD  HEENT:  SCOTT, EOMI. Neck: No Bruits/JVD   Lungs:   CTAB. Good respiratory effort  Heart:   RR S1 S2 without M/R/G  Abdomen: ND,NT, BSX4,   Extremities:   No LE edema. No cyanosis.   Skin:  no jaundice/lesions      Data Review:     Meds/Labs/Tests reviewed    Current Shift:  701 - 1900  In: 200 [P.O.:200]  Out: 50   Last three shifts:  1901 -  0700  In: 812.1 [I.V.:500]  Out: 750 [Urine:750]  Recent Labs      177   WBC  37.5*  35.4*  29.9*   RBC  2.45*  2.56*  1.84*   HGB  7.8*  8.2*  6.2*   HCT  22.0*  22.9*  17.7*   PLT  80*  82*  41*   GRANS   --   59   --    LYMPH   --   13*   --    EOS   --   0   --        Recent Labs      17   01417   0217  17   0003  17   1700  17   0558  17   2250  17   1655   BUN  15  15  13  14  16  20*  19*  20*   CREA  1.54*  1.62*  1.37*  1.32*  1.32*  1.43*  1.56*  1.59*   CA  7.5*  7.3*  6.9*  6.9*  8.1*  7.7*  7.4*  7.4*   K  5.3  5.0  3.1*  3.2*  4.0  4.9  5.1  5.6*   NA  131*  131*  132*  131*  133*  130*  127*  126*   CL  103  103  102  102  101  100  98*  97*   CO2  20*  20*  19*  19*  18*  19*  19*  17*   GLU  120*  131*  211*  209*  138*  130*  166*  101*        Lab Results   Component Value Date/Time    Glucose 120 2017 01:45 AM    Glucose 131 2017 09:10 PM    Glucose 211 2017 02:17 AM    Glucose 209 2017 12:03 AM    Glucose 138 2017 05:00 PM          Care coordination with Nursing/Consultants/staff: 15  Prior history, labs, and charting reviewed: 10    Procedures/Imaging:  pCXR     Total time spent with chart review, patient examination/education, discussion with staff on case,documentation and medication management / adjustment  :  30 Minutes      Dr Celine Solis  Pager: [phone]

## 2017-06-24 NOTE — PROGRESS NOTES
[facility] care of pt at this time. Assessment complete. Pt alert and oriented x 2 disoriented to place and situation. Denies SOB and chest pain. Pt lungs dimished bilaterally. Cap refill less than 3 seconds. Pt denies numbness and tingling to all extremities. Stated pain 6/10. Pt has 22 G IV to right forearm. Pt has mepilex dressing x3 to left hip/thigh and leg CDI . SCD's in place. Pt encouraged to continue use of IS. Pt verbalized understanding. Ice pack applied. Call light and possessions within reach. Bed in low position. Will continue to monitor. 1630  Ostomy care, bag changed. Stoma pink and beefy. No s/s of infection. 1720  Monitor tech informed nurse that pt is NSR on box 5 at this time. [address]  Informed Dr Demetrius Walters that pt has not voided since this morning. Telephone order with readback for straight cath one time . 1850  Pt straight cathed output 100ml of clear radha urine     Shift summary   Pt is alert and oriented x 2. Pt had uneventful shift. Pt is toe touch weight bearing status due to confusion PT was unable to ambulate pt. Not safe at this time to ambulate pt. Pt Straight cath one time  Pain controlled by PRN medication.

## 2017-06-24 NOTE — PROGRESS NOTES
Phone: [phone]  Paging : [phone]      Hematology / Oncology Progress Note    Admit Date: 2017    Assessment:     Principal Problem:    Closed left hip fracture ([address] (2017)    Active Problems:    Myelodysplasia (myelodysplastic syndrome) (HCC) ()      Crohn's disease (HCC) ()      Hypertension ()      CKD (chronic kidney disease) stage 3, GFR 30-59 ml/min ()      Overview: stage 3   from stone      Hyponatremia (2017)      Dementia (2017)      Anemia (2017)        Plan:     *CMML on hydrea as outpatient  Transfuse for hgb <7  Platelets responded    Subjective:     Confused    Objective:     Patient Vitals for the past 24 hrs:   BP Temp Pulse Resp SpO2   17 1031 134/72 98.5 °F (36.9 °C) (!) 120 14 91 %   17 0835 98/59 98.9 °F (37.2 °C) (!) 113 14 91 %   17 0320 113/61 97.8 °F (36.6 °C) 97 14 90 %   17 2250 123/73 98.4 °F (36.9 °C) (!) 104 16 97 %   17 2000 112/48 97.8 °F (36.6 °C) 99 16 100 %   17 1944 108/54 97.8 °F (36.6 °C) 94 16 100 %   17 1900 111/61 97.8 °F (36.6 °C) 89 14 95 %   17 1715 107/51 98 °F (36.7 °C) 95 14 97 %   17 1647 99/50 98 °F (36.7 °C) 85 14 -   17 1527 98/54 98 °F (36.7 °C) 77 16 -         Intake/Output Summary (Last 24 hours) at 17 1303  Last data filed at 17 1006   Gross per 24 hour   Intake            512.1 ml   Output              200 ml   Net            312.1 ml       Temp (24hrs), Av.1 °F (36.7 °C), Min:97.8 °F (36.6 °C), Max:98.9 °F (37.2 °C)         ROS: negative fever,chills,CP,jada blood loss  POS: confusion, post op pain  Exam:  General: NAD  HEENT: PERRLA  Lungs:CTA  Abdomen:palpable spleen  Extremities:no c/c/e  Skin: C/D/I  Neurologic:disoriented but calm    Recent Results (from the past 24 hour(s))   GLUCOSE, POC    Collection Time: 17  4:42 PM   Result Value Ref Range    Glucose (POC) 86 70 - 110 mg/dL   GLUCOSE, POC    Collection Time: 17  9:06 PM Result Value Ref Range    Glucose (POC) 119 (H) 70 - 259 mg/dL   METABOLIC PANEL, BASIC    Collection Time: 06/23/17  9:10 PM   Result Value Ref Range    Sodium 131 (L) 136 - 145 mmol/L    Potassium 5.0 3.5 - 5.5 mmol/L    Chloride 103 100 - 108 mmol/L    CO2 20 (L) 21 - 32 mmol/L    Anion gap 8 3.0 - 18 mmol/L    Glucose 131 (H) 74 - 99 mg/dL    BUN 15 7.0 - 18 MG/DL    Creatinine 1.62 (H) 0.6 - 1.3 MG/DL    BUN/Creatinine ratio 9 (L) 12 - 20      GFR est AA 37 (L) >60 ml/min/1.73m2    GFR est non-AA 31 (L) >60 ml/min/1.73m2    Calcium 7.3 (L) 8.5 - 10.1 MG/DL   CBC WITH AUTOMATED DIFF    Collection Time: 06/23/17  9:10 PM   Result Value Ref Range    WBC 35.4 (H) 4.6 - 13.2 K/uL    RBC 2.56 (L) 4.20 - 5.30 M/uL    HGB 8.2 (L) 12.0 - 16.0 g/dL    HCT 22.9 (L) 35.0 - 45.0 %    MCV 89.5 74.0 - 97.0 FL    MCH 32.0 24.0 - 34.0 PG    MCHC 35.8 31.0 - 37.0 g/dL    RDW 16.6 (H) 11.6 - 14.5 %    PLATELET 82 (L) 872 - 420 K/uL    MPV 9.8 9.2 - 11.8 FL    NEUTROPHILS 59 42 - 75 %    BAND NEUTROPHILS 11 (H) 0 - 5 %    LYMPHOCYTES 13 (L) 20 - 51 %    MONOCYTES 17 (H) 2 - 9 %    EOSINOPHILS 0 0 - 5 %    BASOPHILS 0 0 - 3 %    NRBC 1.0 (H) 0  WBC    ABS. NEUTROPHILS 20.9 (H) 1.8 - 8.0 K/UL    ABS. LYMPHOCYTES 4.6 (H) 0.8 - 3.5 K/UL    ABS. MONOCYTES 6.0 (H) 0 - 1.0 K/UL    ABS. EOSINOPHILS 0.0 0.0 - 0.4 K/UL    ABS.  BASOPHILS 0.0 0.0 - 0.1 K/UL    PLATELET COMMENTS DECREASED PLATELETS      RBC COMMENTS POIKILOCYTOSIS  SLIGHT  MACROCYTOSIS  1+        WBC COMMENTS SMUDGE CELLS      DF MANUAL     CBC W/O DIFF    Collection Time: 06/24/17  1:45 AM   Result Value Ref Range    WBC 37.5 (H) 4.6 - 13.2 K/uL    RBC 2.45 (L) 4.20 - 5.30 M/uL    HGB 7.8 (L) 12.0 - 16.0 g/dL    HCT 22.0 (L) 35.0 - 45.0 %    MCV 89.8 74.0 - 97.0 FL    MCH 31.8 24.0 - 34.0 PG    MCHC 35.5 31.0 - 37.0 g/dL    RDW 16.7 (H) 11.6 - 14.5 %    PLATELET 80 (L) 676 - 420 K/uL    MPV 10.2 9.2 - 19.4 FL   METABOLIC PANEL, BASIC    Collection Time: 06/24/17 1:45 AM   Result Value Ref Range    Sodium 131 (L) 136 - 145 mmol/L    Potassium 5.3 3.5 - 5.5 mmol/L    Chloride 103 100 - 108 mmol/L    CO2 20 (L) 21 - 32 mmol/L    Anion gap 8 3.0 - 18 mmol/L    Glucose 120 (H) 74 - 99 mg/dL    BUN 15 7.0 - 18 MG/DL    Creatinine 1.54 (H) 0.6 - 1.3 MG/DL    BUN/Creatinine ratio 10 (L) 12 - 20      GFR est AA 39 (L) >60 ml/min/1.73m2    GFR est non-AA 33 (L) >60 ml/min/1.73m2    Calcium 7.5 (L) 8.5 - 10.1 MG/DL   GLUCOSE, POC    Collection Time: 06/24/17  6:41 AM   Result Value Ref Range    Glucose (POC) 88 70 - 110 mg/dL   GLUCOSE, POC    Collection Time: 06/24/17 12:05 PM   Result Value Ref Range    Glucose (POC) 111 (H) 70 - 110 mg/dL       Suzanne Bustillo MD

## 2017-06-24 NOTE — ROUTINE PROCESS
Bedside and Verbal shift change report given to Francois Ahuja RN (oncoming nurse) by Catrachito Lopez RN (offgoing nurse). Report included the following information SBAR, Kardex, MAR and Recent Results.

## 2017-06-24 NOTE — PROGRESS NOTES
Problem: Mobility Impaired (Adult and Pediatric)  Goal: *Acute Goals and Plan of Care (Insert Text)  Physical Therapy Goals  Initiated 6/23/2017 and to be accomplished within 3-5 day(s)  1. Patient will move from supine <> sit with Mod-Min A in prep for out of bed activity and change of position. 2. Patient will perform sit<> stand with Mod A with rolling walker in prep for transfers/ambulation. 3. Patient will transfer from bed <> chair with Max A with rolling walker for time up in chair for completion of ADL activity. 4. Patient will ambulate 20-30 feet with LRAD/ Max A for increase functional mobility. Outcome: Progressing Towards Goal  PHYSICAL THERAPY TREATMENT     Patient: Antonina Wiseman (18 y.o. female)  Date: 6/24/2017  Diagnosis: Closed left hip fracture (HCC)  FRACTURED LEFT HIP Closed left hip fracture (HCC)  Procedure(s) (LRB):  INTRAMEDULLARY NAILING LEFT HIP WITH C-ARM  (Left) 2 Days Post-Op  Precautions: Fall, TTWB   Chart, physical therapy assessment, plan of care and goals were reviewed. ASSESSMENT:  Pt currently only oriented to self and pain. Pt is very confused and only able to be reoriented for short periods. Pt was agreeable to exercise and EOB sitting, but completes 7 min of sitting. Pt not safe for standing attempt at this time, due to mentation. Progression toward goals:  [X]      Improving appropriately and progressing toward goals  [ ]      Improving slowly and progressing toward goals  [ ]      Not making progress toward goals and plan of care will be adjusted       PLAN:  Patient continues to benefit from skilled intervention to address the above impairments. Continue treatment per established plan of care. Discharge Recommendations:  Travis Escobar  Further Equipment Recommendations for Discharge:  bedside commode and rolling walker       SUBJECTIVE:   Patient stated You will need to make an appointment to talk to him.  ()      OBJECTIVE DATA SUMMARY: Critical Behavior:  Neurologic State: Alert  Orientation Level: Oriented to person, Oriented to time, Disoriented to situation, Disoriented to place     Safety/Judgement: Decreased awareness of need for assistance, Decreased awareness of need for safety, Decreased insight into deficits  Functional Mobility Training:  Bed Mobility:  Rolling: Moderate assistance  Supine to Sit: Maximum assistance  Sit to Supine: Maximum assistance  Scooting: Total assistance  Balance:  Sitting: Impaired  Sitting - Static: Fair (occasional)  Sitting - Dynamic: Poor (constant support)  Therapeutic Exercises:         EXERCISE   Sets   Reps   Active Active Assist   Passive Self ROM   Comments   Ankle Pumps 1 20 [X] [ ] [ ] [ ]     Quad Sets/Glut Sets 1 10 [X] [ ] [ ] [ ]     Hamstring Sets     [ ] [ ] [ ] [ ]     Short Arc Quads 1 10 [X] [ ] [ ] [ ]     Heel Slides 1 8 [ ] [X] [ ] [ ]     [place]     [ ] [ ] [ ] [ ]     Hip Abd/Add 1 10 [X] [ ] [ ] [ ]     [place] 1 10 [X] [ ] [ ] [ ]     Seated Marching 1 8 [ ] [X] [ ] [ ]     Jimmy Hernandez     [ ] [ ] [ ] [ ]           [ ] [ ] [ ] [ ]           Pain:  Pain Scale 1: Numeric (0 - 10)  Pain Intensity 1: 6  Activity Tolerance:   Good  Please refer to the flowsheet for vital signs taken during this treatment.   After treatment:   [ ] Patient left in no apparent distress sitting up in chair  [X] Patient left in no apparent distress in bed  [X] Call bell left within reach  [X] Nursing notified  [ ] Caregiver present  [ ] Bed alarm activated      Selina Fiore PTA   Time Calculation: 35 mins

## 2017-06-25 LAB
ABO + RH BLD: NORMAL
ANION GAP BLD CALC-SCNC: 10 MMOL/L (ref 3–18)
BLD PROD TYP BPU: NORMAL
BLOOD GROUP ANTIBODIES SERPL: NORMAL
BPU ID: NORMAL
BUN SERPL-MCNC: 18 MG/DL (ref 7–18)
BUN/CREAT SERPL: 15 (ref 12–20)
CALCIUM SERPL-MCNC: 7.5 MG/DL (ref 8.5–10.1)
CALLED TO:,BCALL1: NORMAL
CHLORIDE SERPL-SCNC: 106 MMOL/L (ref 100–108)
CO2 SERPL-SCNC: 18 MMOL/L (ref 21–32)
CREAT SERPL-MCNC: 1.21 MG/DL (ref 0.6–1.3)
CROSSMATCH RESULT,%XM: NORMAL
ERYTHROCYTE [DISTWIDTH] IN BLOOD BY AUTOMATED COUNT: 17.1 % (ref 11.6–14.5)
GLUCOSE BLD STRIP.AUTO-MCNC: 120 MG/DL (ref 70–110)
GLUCOSE BLD STRIP.AUTO-MCNC: 125 MG/DL (ref 70–110)
GLUCOSE BLD STRIP.AUTO-MCNC: 94 MG/DL (ref 70–110)
GLUCOSE SERPL-MCNC: 89 MG/DL (ref 74–99)
HCT VFR BLD AUTO: 19.2 % (ref 35–45)
HGB BLD-MCNC: 6.7 G/DL (ref 12–16)
MCH RBC QN AUTO: 31.6 PG (ref 24–34)
MCHC RBC AUTO-ENTMCNC: 34.9 G/DL (ref 31–37)
MCV RBC AUTO: 90.6 FL (ref 74–97)
PLATELET # BLD AUTO: 79 K/UL (ref 135–420)
PMV BLD AUTO: 11 FL (ref 9.2–11.8)
POTASSIUM SERPL-SCNC: 4.5 MMOL/L (ref 3.5–5.5)
RBC # BLD AUTO: 2.12 M/UL (ref 4.2–5.3)
SODIUM SERPL-SCNC: 134 MMOL/L (ref 136–145)
SPECIMEN EXP DATE BLD: NORMAL
STATUS OF UNIT,%ST: NORMAL
UNIT DIVISION, %UDIV: 0
WBC # BLD AUTO: 36.8 K/UL (ref 4.6–13.2)

## 2017-06-25 PROCEDURE — 74011250636 HC RX REV CODE- 250/636: Performed by: HOSPITALIST

## 2017-06-25 PROCEDURE — 74011250636 HC RX REV CODE- 250/636: Performed by: ORTHOPAEDIC SURGERY

## 2017-06-25 PROCEDURE — 74011250637 HC RX REV CODE- 250/637: Performed by: SPECIALIST

## 2017-06-25 PROCEDURE — P9016 RBC LEUKOCYTES REDUCED: HCPCS | Performed by: INTERNAL MEDICINE

## 2017-06-25 PROCEDURE — 85027 COMPLETE CBC AUTOMATED: CPT | Performed by: FAMILY MEDICINE

## 2017-06-25 PROCEDURE — 86900 BLOOD TYPING SEROLOGIC ABO: CPT | Performed by: INTERNAL MEDICINE

## 2017-06-25 PROCEDURE — 94640 AIRWAY INHALATION TREATMENT: CPT

## 2017-06-25 PROCEDURE — 74011250637 HC RX REV CODE- 250/637: Performed by: ORTHOPAEDIC SURGERY

## 2017-06-25 PROCEDURE — 36430 TRANSFUSION BLD/BLD COMPNT: CPT

## 2017-06-25 PROCEDURE — 77030027138 HC INCENT SPIROMETER -A

## 2017-06-25 PROCEDURE — 74011250636 HC RX REV CODE- 250/636: Performed by: INTERNAL MEDICINE

## 2017-06-25 PROCEDURE — 65660000000 HC RM CCU STEPDOWN

## 2017-06-25 PROCEDURE — 74011250636 HC RX REV CODE- 250/636: Performed by: SPECIALIST

## 2017-06-25 PROCEDURE — 74011000250 HC RX REV CODE- 250: Performed by: INTERNAL MEDICINE

## 2017-06-25 PROCEDURE — 94760 N-INVAS EAR/PLS OXIMETRY 1: CPT

## 2017-06-25 PROCEDURE — 74011250637 HC RX REV CODE- 250/637: Performed by: HOSPITALIST

## 2017-06-25 PROCEDURE — 82962 GLUCOSE BLOOD TEST: CPT

## 2017-06-25 PROCEDURE — 36415 COLL VENOUS BLD VENIPUNCTURE: CPT | Performed by: FAMILY MEDICINE

## 2017-06-25 PROCEDURE — 86920 COMPATIBILITY TEST SPIN: CPT | Performed by: INTERNAL MEDICINE

## 2017-06-25 PROCEDURE — 80048 BASIC METABOLIC PNL TOTAL CA: CPT | Performed by: FAMILY MEDICINE

## 2017-06-25 RX ORDER — HEPARIN SODIUM 5000 [USP'U]/ML
5000 INJECTION, SOLUTION INTRAVENOUS; SUBCUTANEOUS EVERY 12 HOURS
Status: DISCONTINUED | OUTPATIENT
Start: 2017-06-25 | End: 2017-06-26 | Stop reason: HOSPADM

## 2017-06-25 RX ORDER — IPRATROPIUM BROMIDE AND ALBUTEROL SULFATE 2.5; .5 MG/3ML; MG/3ML
3 SOLUTION RESPIRATORY (INHALATION)
Status: DISCONTINUED | OUTPATIENT
Start: 2017-06-25 | End: 2017-06-26 | Stop reason: HOSPADM

## 2017-06-25 RX ORDER — SODIUM CHLORIDE 9 MG/ML
250 INJECTION, SOLUTION INTRAVENOUS AS NEEDED
Status: DISCONTINUED | OUTPATIENT
Start: 2017-06-25 | End: 2017-06-25 | Stop reason: SDUPTHER

## 2017-06-25 RX ORDER — SODIUM CHLORIDE 9 MG/ML
250 INJECTION, SOLUTION INTRAVENOUS AS NEEDED
Status: DISCONTINUED | OUTPATIENT
Start: 2017-06-25 | End: 2017-06-26 | Stop reason: HOSPADM

## 2017-06-25 RX ORDER — ACETAMINOPHEN 325 MG/1
650 TABLET ORAL ONCE
Status: COMPLETED | OUTPATIENT
Start: 2017-06-25 | End: 2017-06-25

## 2017-06-25 RX ORDER — FUROSEMIDE 10 MG/ML
20 INJECTION INTRAMUSCULAR; INTRAVENOUS ONCE
Status: COMPLETED | OUTPATIENT
Start: 2017-06-25 | End: 2017-06-25

## 2017-06-25 RX ADMIN — FERROUS SULFATE TAB 325 MG (65 MG ELEMENTAL FE) 325 MG: 325 (65 FE) TAB at 09:54

## 2017-06-25 RX ADMIN — FAMOTIDINE 20 MG: 20 TABLET ORAL at 21:10

## 2017-06-25 RX ADMIN — FUROSEMIDE 20 MG: 10 INJECTION, SOLUTION INTRAMUSCULAR; INTRAVENOUS at 11:37

## 2017-06-25 RX ADMIN — KETOROLAC TROMETHAMINE 30 MG: 30 INJECTION, SOLUTION INTRAMUSCULAR at 19:34

## 2017-06-25 RX ADMIN — Medication 5 ML: at 14:00

## 2017-06-25 RX ADMIN — IPRATROPIUM BROMIDE AND ALBUTEROL SULFATE 3 ML: .5; 3 SOLUTION RESPIRATORY (INHALATION) at 19:53

## 2017-06-25 RX ADMIN — DONEPEZIL HYDROCHLORIDE 5 MG: 5 TABLET, FILM COATED ORAL at 21:10

## 2017-06-25 RX ADMIN — ONDANSETRON 4 MG: 2 INJECTION INTRAMUSCULAR; INTRAVENOUS at 19:34

## 2017-06-25 RX ADMIN — MULTIPLE VITAMINS W/ MINERALS TAB 1 TABLET: TAB at 09:54

## 2017-06-25 RX ADMIN — SODIUM CHLORIDE TAB 1 GM 1 G: 1 TAB at 10:04

## 2017-06-25 RX ADMIN — HEPARIN SODIUM 5000 UNITS: 5000 INJECTION, SOLUTION INTRAVENOUS; SUBCUTANEOUS at 21:11

## 2017-06-25 RX ADMIN — LATANOPROST 1 DROP: 50 SOLUTION OPHTHALMIC at 21:32

## 2017-06-25 RX ADMIN — FAMOTIDINE 20 MG: 20 TABLET ORAL at 09:54

## 2017-06-25 RX ADMIN — IPRATROPIUM BROMIDE AND ALBUTEROL SULFATE 3 ML: .5; 3 SOLUTION RESPIRATORY (INHALATION) at 11:47

## 2017-06-25 RX ADMIN — SODIUM CHLORIDE 100 ML/HR: 900 INJECTION, SOLUTION INTRAVENOUS at 01:29

## 2017-06-25 RX ADMIN — ACETAMINOPHEN 650 MG: 325 TABLET ORAL at 11:37

## 2017-06-25 NOTE — PROGRESS NOTES
Shift Summary- Pt experienced an uneventful shift. Received 2units of PRBCs. Bedside and Verbal shift change report given to GARLAND Nolen (oncoming nurse) by Senthil Sharma   (offgoing nurse).  Report included the following information SBAR, Kardex, ED Summary, Procedure Summary, Intake/Output, MAR, Recent Results and Cardiac Rhythm SR.

## 2017-06-25 NOTE — PROGRESS NOTES
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 Harley Yadav after rehab  Anemia with DC pending will transfuse one unit permission granted from patient and daughter  Will need weekly CBC in rehab    Subjective:     Notes some wheexing    Objective:     Patient Vitals for the past 24 hrs:   BP Temp Pulse Resp SpO2   17 1041 137/71 97.8 °F (36.6 °C) 93 17 99 %   17 0736 132/70 98.2 °F (36.8 °C) 79 18 100 %   17 2259 107/55 98.2 °F (36.8 °C) 86 18 100 %   17 1838 108/61 98.7 °F (37.1 °C) (!) 101 18 98 %   17 1522 102/47 98.9 °F (37.2 °C) 95 15 99 %         Intake/Output Summary (Last 24 hours) at 17 1051  Last data filed at 17 1019   Gross per 24 hour   Intake             1910 ml   Output             1050 ml   Net              860 ml       Temp (24hrs), Av.4 °F (36.9 °C), Min:97.8 °F (36.6 °C), Max:98.9 °F (37.2 °C)           ROS: Pos/fatigue,wheezing, post op pain  Neg: fever,chills,SSCp.  Abdominal complaints      Exam:  General: NAD  HEENT: conjunctival pallor  Lungs: faint bilateral whhezeAbdomen:palapble spleen  ABD: spleen 3 cm below costal margin  Neurologic: AAo times 4    Recent Results (from the past 24 hour(s))   GLUCOSE, POC    Collection Time: 17 12:05 PM   Result Value Ref Range    Glucose (POC) 111 (H) 70 - 110 mg/dL   GLUCOSE, POC    Collection Time: 17  4:23 PM   Result Value Ref Range    Glucose (POC) 131 (H) 70 - 110 mg/dL   GLUCOSE, POC    Collection Time: 06/24/17  9:04 PM   Result Value Ref Range    Glucose (POC) 103 70 - 110 mg/dL   CBC W/O DIFF    Collection Time: 06/25/17  5:45 AM   Result Value Ref Range    WBC 36.8 (H) 4.6 - 13.2 K/uL    RBC 2.12 (L) 4.20 - 5.30 M/uL    HGB 6.7 (L) 12.0 - 16.0 g/dL    HCT 19.2 (L) 35.0 - 45.0 %    MCV 90.6 74.0 - 97.0 FL    MCH 31.6 24.0 - 34.0 PG    MCHC 34.9 31.0 - 37.0 g/dL    RDW 17.1 (H) 11.6 - 14.5 %    PLATELET 79 (L) 826 - 420 K/uL    MPV 11.0 9.2 - 00.8 FL   METABOLIC PANEL, BASIC    Collection Time: 06/25/17  5:45 AM   Result Value Ref Range    Sodium 134 (L) 136 - 145 mmol/L    Potassium 4.5 3.5 - 5.5 mmol/L    Chloride 106 100 - 108 mmol/L    CO2 18 (L) 21 - 32 mmol/L    Anion gap 10 3.0 - 18 mmol/L    Glucose 89 74 - 99 mg/dL    BUN 18 7.0 - 18 MG/DL    Creatinine 1.21 0.6 - 1.3 MG/DL    BUN/Creatinine ratio 15 12 - 20      GFR est AA 52 (L) >60 ml/min/1.73m2    GFR est non-AA 43 (L) >60 ml/min/1.73m2    Calcium 7.5 (L) 8.5 - 10.1 MG/DL   GLUCOSE, POC    Collection Time: 06/25/17  5:59 AM   Result Value Ref Range    Glucose (POC) 94 70 - 110 mg/dL   TYPE & CROSSMATCH    Collection Time: 06/25/17 10:05 AM   Result Value Ref Range    Crossmatch Expiration 06/28/2017     ABO/Rh(D) PENDING     Antibody screen PENDING        Oz Caban MD

## 2017-06-25 NOTE — PROGRESS NOTES
Daily Progress Note: 2017 1:37 PM   Admit Date: 2017    Patient seen in follow up for multiple medical problems as listed below:  Patient Active Problem List   Diagnosis Code    Myelodysplasia (myelodysplastic syndrome) ([facility] [address] D46.9    Crohn's disease ([facility] [address] K50.90    Hypertension I10    CKD (chronic kidney disease) stage 3, GFR 30-59 ml/min N18.3    Closed left hip fracture (HCC) S72.002A    Hyponatremia E87.1    Dementia F03.90    Anemia D64.9       Assesment     65 yo female with  CML, hyponatremia, hypokalemia, fell at home and fractured her left hip  Her surgery was deferred until  so her sodium and anemia + thrombocytopenia could be corrected. [facility] consulting. S/o L hip IM nailing  Dr Ottoniel Martins.      Hyponatremia-resolved. Thought due to intake. No offending meds. Fluid restrict. Get Urine Na/ Osm. TSH low nl. Cortisol up. Post-op hypoxia - due to atelectasis. Wean off . Duonebs for exp eheezing   Left hip fracture s/p repair-PT with ltd weight bearing,  platelets being low increase her bleeding risk-will go to SNF for d/c when ready  Dementia- mild -moderate  Hyperglycemia- accuchecks with SSI, check A1C, not know to be diabetic  Crohns with colostomy  CMML -[facility] consulting. Some reactive WBC elevation to 37K. Post-surgical blood loss anemia-2 units PRBC plus platelets , 0  Hypokalemia-PO and IV Kcl repeleted  CKD 3    DVT Protocol Active: start Hep TID. Recommend daily lovenox for 14d  Code Status:  Full Code     Disposition: SNF when approved    Subjective:     CC: Syncope and Hip Pain    Interval History: off  this am. Duonebs for exp eheezing. Hg <7 needs transfusion.  Na improved     ROS: 11 point ROS negative except for hip pain    Objective:     Visit Vitals    /65    Pulse 90    Temp 99.1 °F (37.3 °C)    Resp 18    Ht 4' 11\" (1.499 m)    Wt 47.3 kg (104 lb 4.8 oz)    SpO2 100%    BMI 21.07 kg/m2       Temp (24hrs), Av.3 °F (36.8 °C), Min:97.1 °F (36.2 °C), Max:99.1 °F (37.3 °C)        Intake/Output Summary (Last 24 hours) at 06/25/17 1243  Last data filed at 06/25/17 1019   Gross per 24 hour   Intake             1910 ml   Output             1050 ml   Net              860 ml       Gen: AOx1-2, NAD  HEENT:  SCOTT, EOMI. Neck: No Bruits/JVD   Lungs:   End exp wheeze. Good respiratory effort  Heart:   RR S1 S2 without M/R/G  Abdomen: ND,NT, BSX4,   Extremities:   No LE edema. No cyanosis.   Skin:  no jaundice/lesions      Data Review:     Meds/Labs/Tests reviewed    Current Shift:  06/25 0701 - 06/25 1900  In: 250 [P.O.:250]  Out: 600   Last three shifts:  06/23 1901 - 06/25 0700  In: 1921.7 [P.O.:660; I.V.:1200]  Out: 650 [Urine:600]  Recent Labs      06/25/17   0545  06/24/17   0145  06/23/17   2110   WBC  36.8*  37.5*  35.4*   RBC  2.12*  2.45*  2.56*   HGB  6.7*  7.8*  8.2*   HCT  19.2*  22.0*  22.9*   PLT  79*  80*  82*   GRANS   --    --   59   LYMPH   --    --   13*   EOS   --    --   0       Recent Labs      06/25/17   0545  06/24/17   0145  06/23/17   2110  06/23/17   0217  06/23/17   0003  06/22/17   1700   BUN  18  15  15  13  14  16   CREA  1.21  1.54*  1.62*  1.37*  1.32*  1.32*   CA  7.5*  7.5*  7.3*  6.9*  6.9*  8.1*   K  4.5  5.3  5.0  3.1*  3.2*  4.0   NA  134*  131*  131*  132*  131*  133*   CL  106  103  103  102  102  101   CO2  18*  20*  20*  19*  19*  18*   GLU  89  120*  131*  211*  209*  138*        Lab Results   Component Value Date/Time    Glucose 89 06/25/2017 05:45 AM    Glucose 120 06/24/2017 01:45 AM    Glucose 131 06/23/2017 09:10 PM    Glucose 211 06/23/2017 02:17 AM    Glucose 209 06/23/2017 12:03 AM          Care coordination with Nursing/Consultants/staff: 15  Prior history, labs, and charting reviewed: 10    Procedures/Imaging:  pCXR 6/21  XR L knee and Hip  Hip surgery 6/22    Total time spent with chart review, patient examination/education, discussion with staff on case,documentation and medication management / adjustment  :  27 Minutes      Dr Adriana Ivory  Pager: [phone]

## 2017-06-25 NOTE — CDMP QUERY
Please clarify if this patient is being treated/managed for:    =>Metabolic encephalopathy  with ( superimposed dementia)   =>Other Explanation of clinical findings  =>Unable to Determine (no explanation of clinical findings)    The medical record reflects the following:    Risk:elderly/hip fracture    Clinical Indicators:Patient with confusion more than usual . Patient is S/p hip surgery with 300ml  blood loss. Hyponatremic with dehydration. Treatment: NS ivf, 2 units prbc. Please clarify and document your clinical opinion in the progress notes and discharge summary including the definitive and/or presumptive diagnosis, (suspected or probable), related to the above clinical findings. Please include clinical findings supporting your diagnosis. If you DECLINE this query or would like to communicate with [facility], please utilize the \"Equivalent DATA message box\" at the TOP of the Progress Note on the right.       Thank you,    Stacy Chan RN -CDMP

## 2017-06-25 NOTE — PROGRESS NOTES
Problem: Mobility Impaired (Adult and Pediatric)  Goal: *Acute Goals and Plan of Care (Insert Text)  Physical Therapy Goals  Initiated 6/23/2017 and to be accomplished within 3-5 day(s)  1. Patient will move from supine <> sit with Mod-Min A in prep for out of bed activity and change of position. 2. Patient will perform sit<> stand with Mod A with rolling walker in prep for transfers/ambulation. 3. Patient will transfer from bed <> chair with Max A with rolling walker for time up in chair for completion of ADL activity. 4. Patient will ambulate 20-30 feet with LRAD/ Max A for increase functional mobility. PT session held due to:  [ ]  Nausea/vomiting  [X]  RN Communication/ suggestion: Pt currently receiving 1st unit of blood transfusion. [ ]  Extreme Pain  [ ]  Dialysis treatment in progress. Will f/u later as schedule allows. Thank you.   Gina Ocampo, PTA

## 2017-06-25 NOTE — PROGRESS NOTES
20:15 Assessment completed. Lungs are clear bilat. Offers 0 c/o hip pain or discomfort. Mepilex dsg's x 3 are D/I with sm shadow on outer edge of lg mepilex dsg. Offers 0 c/o hip pain or discomfort. 0 CP, pressure, or SOB. Ice pack was filled & applied. Resting quietly in bed with eyes closed, step-daughter is @ bedside for comfort. 22:45 Shift assessment completed. See nsg flow sheet for details. 02:55 Reassessed with 0 changes noted. Resting quietly in bed with eyes closed between cares. 07:00 Paged Dr Bharat Zaragoza re: Hgb of 6.7. VS were reviewed. 0 new orders for transfusion as along as pt remains asymptomatic. Relayed info to pt & . 07:30 Bedside and Verbal shift change report given to Natalio Bunch RN (oncoming nurse) by Senia Ramirez RN (offgoing nurse). Report included the following information SBAR.

## 2017-06-26 VITALS
HEART RATE: 102 BPM | SYSTOLIC BLOOD PRESSURE: 143 MMHG | WEIGHT: 105.82 LBS | OXYGEN SATURATION: 100 % | HEIGHT: 59 IN | TEMPERATURE: 98.9 F | RESPIRATION RATE: 17 BRPM | DIASTOLIC BLOOD PRESSURE: 73 MMHG | BODY MASS INDEX: 21.33 KG/M2

## 2017-06-26 LAB
ABO + RH BLD: NORMAL
ANION GAP BLD CALC-SCNC: 8 MMOL/L (ref 3–18)
BLD PROD TYP BPU: NORMAL
BLD PROD TYP BPU: NORMAL
BLOOD GROUP ANTIBODIES SERPL: NORMAL
BPU ID: NORMAL
BPU ID: NORMAL
BUN SERPL-MCNC: 21 MG/DL (ref 7–18)
BUN/CREAT SERPL: 13 (ref 12–20)
CALCIUM SERPL-MCNC: 8.1 MG/DL (ref 8.5–10.1)
CALLED TO:,BCALL1: NORMAL
CHLORIDE SERPL-SCNC: 106 MMOL/L (ref 100–108)
CO2 SERPL-SCNC: 21 MMOL/L (ref 21–32)
CREAT SERPL-MCNC: 1.59 MG/DL (ref 0.6–1.3)
CROSSMATCH RESULT,%XM: NORMAL
CROSSMATCH RESULT,%XM: NORMAL
ERYTHROCYTE [DISTWIDTH] IN BLOOD BY AUTOMATED COUNT: 17.2 % (ref 11.6–14.5)
GLUCOSE BLD STRIP.AUTO-MCNC: 101 MG/DL (ref 70–110)
GLUCOSE BLD STRIP.AUTO-MCNC: 108 MG/DL (ref 70–110)
GLUCOSE SERPL-MCNC: 105 MG/DL (ref 74–99)
HCT VFR BLD AUTO: 31 % (ref 35–45)
HEMOCCULT STL QL: NEGATIVE
HGB BLD-MCNC: 10.6 G/DL (ref 12–16)
MCH RBC QN AUTO: 30.1 PG (ref 24–34)
MCHC RBC AUTO-ENTMCNC: 34.2 G/DL (ref 31–37)
MCV RBC AUTO: 88.1 FL (ref 74–97)
PLATELET # BLD AUTO: 79 K/UL (ref 135–420)
PMV BLD AUTO: 11.1 FL (ref 9.2–11.8)
POTASSIUM SERPL-SCNC: 4.5 MMOL/L (ref 3.5–5.5)
RBC # BLD AUTO: 3.52 M/UL (ref 4.2–5.3)
SODIUM SERPL-SCNC: 135 MMOL/L (ref 136–145)
SPECIMEN EXP DATE BLD: NORMAL
STATUS OF UNIT,%ST: NORMAL
STATUS OF UNIT,%ST: NORMAL
UNIT DIVISION, %UDIV: 0
UNIT DIVISION, %UDIV: 0
WBC # BLD AUTO: 32.6 K/UL (ref 4.6–13.2)

## 2017-06-26 PROCEDURE — 97530 THERAPEUTIC ACTIVITIES: CPT

## 2017-06-26 PROCEDURE — 77010033678 HC OXYGEN DAILY

## 2017-06-26 PROCEDURE — 74011250637 HC RX REV CODE- 250/637: Performed by: HOSPITALIST

## 2017-06-26 PROCEDURE — 77030011256 HC DRSG MEPILEX <16IN NO BORD MOLN -A

## 2017-06-26 PROCEDURE — 74011250636 HC RX REV CODE- 250/636: Performed by: ORTHOPAEDIC SURGERY

## 2017-06-26 PROCEDURE — 94760 N-INVAS EAR/PLS OXIMETRY 1: CPT

## 2017-06-26 PROCEDURE — 94640 AIRWAY INHALATION TREATMENT: CPT

## 2017-06-26 PROCEDURE — 85027 COMPLETE CBC AUTOMATED: CPT | Performed by: FAMILY MEDICINE

## 2017-06-26 PROCEDURE — 80048 BASIC METABOLIC PNL TOTAL CA: CPT | Performed by: FAMILY MEDICINE

## 2017-06-26 PROCEDURE — 82962 GLUCOSE BLOOD TEST: CPT

## 2017-06-26 PROCEDURE — 74011250637 HC RX REV CODE- 250/637: Performed by: ORTHOPAEDIC SURGERY

## 2017-06-26 PROCEDURE — 82272 OCCULT BLD FECES 1-3 TESTS: CPT | Performed by: HOSPITALIST

## 2017-06-26 PROCEDURE — 74011000250 HC RX REV CODE- 250: Performed by: INTERNAL MEDICINE

## 2017-06-26 PROCEDURE — 74011250636 HC RX REV CODE- 250/636: Performed by: INTERNAL MEDICINE

## 2017-06-26 PROCEDURE — 36415 COLL VENOUS BLD VENIPUNCTURE: CPT | Performed by: FAMILY MEDICINE

## 2017-06-26 RX ORDER — OXYCODONE AND ACETAMINOPHEN 5; 325 MG/1; MG/1
1 TABLET ORAL
Qty: 15 TAB | Refills: 0 | Status: SHIPPED | OUTPATIENT
Start: 2017-06-26

## 2017-06-26 RX ORDER — DIAZEPAM 2 MG/1
2 TABLET ORAL
Qty: 20 TAB | Refills: 0 | Status: SHIPPED | OUTPATIENT
Start: 2017-06-26

## 2017-06-26 RX ORDER — ALBUTEROL SULFATE 90 UG/1
2 AEROSOL, METERED RESPIRATORY (INHALATION)
Qty: 1 INHALER | Refills: 1 | Status: SHIPPED | OUTPATIENT
Start: 2017-06-26

## 2017-06-26 RX ORDER — DONEPEZIL HYDROCHLORIDE 5 MG/1
5 TABLET, FILM COATED ORAL
Qty: 60 TAB | Refills: 0 | Status: SHIPPED | OUTPATIENT
Start: 2017-06-26

## 2017-06-26 RX ADMIN — ONDANSETRON 4 MG: 2 INJECTION INTRAMUSCULAR; INTRAVENOUS at 07:48

## 2017-06-26 RX ADMIN — IPRATROPIUM BROMIDE AND ALBUTEROL SULFATE 3 ML: .5; 3 SOLUTION RESPIRATORY (INHALATION) at 07:17

## 2017-06-26 RX ADMIN — ONDANSETRON 4 MG: 2 INJECTION INTRAMUSCULAR; INTRAVENOUS at 15:53

## 2017-06-26 RX ADMIN — FAMOTIDINE 20 MG: 20 TABLET ORAL at 11:00

## 2017-06-26 RX ADMIN — HEPARIN SODIUM 5000 UNITS: 5000 INJECTION, SOLUTION INTRAVENOUS; SUBCUTANEOUS at 08:47

## 2017-06-26 RX ADMIN — FERROUS SULFATE TAB 325 MG (65 MG ELEMENTAL FE) 325 MG: 325 (65 FE) TAB at 11:00

## 2017-06-26 RX ADMIN — IPRATROPIUM BROMIDE AND ALBUTEROL SULFATE 3 ML: .5; 3 SOLUTION RESPIRATORY (INHALATION) at 16:05

## 2017-06-26 RX ADMIN — MULTIPLE VITAMINS W/ MINERALS TAB 1 TABLET: TAB at 11:00

## 2017-06-26 RX ADMIN — SODIUM CHLORIDE TAB 1 GM 1 G: 1 TAB at 11:00

## 2017-06-26 NOTE — ROUTINE PROCESS
Bedside and Verbal shift change report given to NATALI GarlandRN (oncoming nurse) by Raul Jesus (offgoing nurse). Report included the following information SBAR, Kardex, Intake/Output and MAR.

## 2017-06-26 NOTE — ROUTINE PROCESS
Bedside and Verbal shift change report given to Bhaskar Arboleda RN by Hernandez Fatima RN.  Report included the following information SBAR, Kardex, OR Summary, Intake/Output and MAR

## 2017-06-26 NOTE — PROGRESS NOTES
Received report from GARLAND Lopez RN. Assessment unchanged. Patient awake and denies pain or discomfort. No needs at this time. /80 (BP 1 Location: Right arm, BP Patient Position: At rest)  Pulse 87  Temp 97.8 °F (36.6 °C)  Resp 17  Ht 4' 11\" (1.499 m)  Wt 48 kg (105 lb 13.1 oz)  SpO2 98%  BMI 21.37 kg/m2.

## 2017-06-26 NOTE — PROGRESS NOTES
1935-Assessment complete at this time. Pt A&O x4. Lung sounds clear bilaterally. Pt has 2L nasal canula. Pt has +dorsi and plantar flexion bilat. +2 pitting edema noted to feet and ankles. Pt denies tingling and numbness in LE's. Pain reported a  0/10 on pain scale. Three mepilex dressings to left hip and leg are C/D/I.  SCD's applied bilat. 22G to rt arm patent and infusing NS @100ml/hr. Skin warm and dry. Abdomen slightly distended and semi soft. Bowel sounds active x4 quadrants. Colostomy site C/D/I. Bag not due to be changed. Patient resting with bed in lowest position. Spouse at bedside. Call light in reach. 2120-Heparin given per [facility], per Dr. Santo Beebe parameters, platelets are 79 and no signs of active bleeding. 2130-Pt spouse states he is concerned with her distended abd. He would like Dr to look at it tomorrow morning during rounding. 0345-Shift reassessment complete at this time. No changes noted to previous assessment. See flow sheet for details. Pt sleeping with bed in lowest position. Call light in reach. 0548-Pt spouse is concerned with her distended abd and remembered pt was in hospital in Feb of 2016 with a GI bleed. Spouse stated he emptied dark green/black stool from ostomy yesterday and combined with pt's low H&H yesterday, he would like to have an occult stool to rule out abd bleed. Pt abd is mildly distended with no pain. Ostomy is patent with loose brown stool as of 0550. Spouse informed that his concerns would be passed to oncoming AM nurse and discussed in  Hourly rounding. 0551-Pure wick changed, green pad changed.

## 2017-06-26 NOTE — ROUTINE PROCESS
Bedside and verbal shift change report given to Noah Benito RN (oncoming nurse) by GARLAND Longoria RN (offgoing nurse). Report included the following information SBAR, Kardex and MAR.

## 2017-06-26 NOTE — PROGRESS NOTES
Pt has been discharged, the [facility] is expecting pt today. Pt will need medical transport to facility. Please call pt  with transport time [phone]. Per previous note:    When discharged pt will be going to The [address] by [employer] for safety,please send avs d/c summary,medication hard scripts,Rn please call report prior to pt leaving @ [phone] for discharge possible today but likely tomorrow, at bedside aware of plan. Transport time 5pm, [place] at [employer] with the [facility] contacted and will let the staff at the [facility] know.

## 2017-06-26 NOTE — PROGRESS NOTES
Problem: Self Care Deficits Care Plan (Adult)  Goal: *Acute Goals and Plan of Care (Insert Text)  Occupational Therapy Goals  Initiated 6/23/2017 within 7 day(s). 1. Patient will perform grooming with supervision/set-up   2. Patient will perform upper body dressing with supervision/set-up. 3. Patient will perform lower body dressing with minimal assistance/contact guard assist.  4. Patient will perform toilet transfers with minimal assistance/contact guard assist.  5. Patient will perform all aspects of toileting with minimal assistance/contact guard assist.  6. Patient will participate in upper extremity therapeutic exercise/activities with supervision/set-up for 5 minutes. 7. Patient will complete standing with supervision for 5 minutes during ADL to increase activity tolerance for functional activity. Outcome: Progressing Towards Goal  OCCUPATIONAL THERAPY TREATMENT     Patient: Andrea Newby (53 y.o. female)  Date: 6/26/2017  Diagnosis: Closed left hip fracture (HCC)  FRACTURED LEFT HIP Closed left hip fracture (HCC)  Procedure(s) (LRB):  INTRAMEDULLARY NAILING LEFT HIP WITH C-ARM  (Left) 4 Days Post-Op  Precautions: Fall, TTWB (O2, nausea)  Chart, occupational therapy assessment, plan of care, and goals were reviewed. ASSESSMENT:  Pt seen with PT for second set of skilled hands to maximize independence and safety. Pt c/o nausea. Spouse present and encouraging. Pt agreeable to attempting to sit as pt wanting to sit on \"her sofa over there\" referring to her sofa in home. Pt requires increased time d/t nausea and able to belch w/o emesis. Pt w/ decreased cognition w/ decreased initiation. Pt wanting to transfer to recliner, however, unable to stand >3-4 seconds and mod/max cues to maintain TTWB. Pt assisted back to bed and placed in chair position of bed. Pt requires extensive assist for all ADLs d/t cognition and nausea, but participatory.   Progression toward goals:  [ ]          Improving appropriately and progressing toward goals  [X]          Improving slowly and progressing toward goals  [ ]          Not making progress toward goals and plan of care will be adjusted       PLAN:  Patient continues to benefit from skilled intervention to address the above impairments. Continue treatment per established plan of care. Discharge Recommendations:  Rehab  Further Equipment Recommendations for Discharge:  N/A       SUBJECTIVE:   Patient stated Can I sit in the recliner? Del Board      OBJECTIVE DATA SUMMARY:   Cognitive/Behavioral Status:  Neurologic State: Alert, Confused  Orientation Level: Oriented to person, Oriented to situation  Cognition: Decreased attention/concentration, Decreased command following, Impaired decision making, Memory loss, Poor safety awareness  Safety/Judgement: Decreased awareness of environment, Decreased awareness of need for assistance, Decreased awareness of need for safety, Decreased insight into deficits  Functional Mobility and Transfers for ADLs:              Bed Mobility:  Supine to Sit: Maximum assistance  Sit to Supine: Maximum assistance;Assist x2  Scooting: Maximum assistance              Transfers:  Sit to Stand: Maximum assistance; Additional time  Balance:  Sitting: Impaired  Standing: Impaired; With support  ADL Intervention:  Feeding  Feeding Assistance: Contact guard assistance  Drink to Mouth: Contact guard assistance     Grooming  Grooming Assistance: Moderate assistance  Brushing/Combing Hair: Moderate assistance     Lower Body Dressing Assistance  Socks: Total assistance (dependent)     Toileting  Toileting Assistance: Total assistance(dependent)  Bladder Hygiene: Total assistance (dependent)  Clothing Management: Total assistance (dependent)     Cognitive Retraining  Orientation Retraining: Reorienting  Problem Solving: Identifying the task  Attention to Task: Single task  Maintains Attention For (Time): 30 seconds  Following Commands:  Follows one step commands/directions  Safety/Judgement: Decreased awareness of environment;Decreased awareness of need for assistance;Decreased awareness of need for safety;Decreased insight into deficits  Cues: Tactile cues provided;Verbal cues provided;Visual cues provided     Pain:  Pre-treatment: 0/10  Post-treatment: 2/10     Activity Tolerance:    Pt able to stand <3-4 seconds. Pt able to complete ADLs with frequent rest breaks. Pt limited by cognition & pain w/ movement. Pt unsteady      Please refer to the flowsheet for vital signs taken during this treatment.   After treatment:   [ ]  Patient left in no apparent distress sitting up in chair  [X]  Patient left in no apparent distress in bed  [X]  Call bell left within reach  [X]  Nursing notified/Jeana  [X]  Caregiver present/spouse  [X]  Bed alarm activated     Thank you for this referral.  Dalia Miller OTR/L  Time Calculation: 30 mins

## 2017-06-26 NOTE — PROGRESS NOTES
Problem: Mobility Impaired (Adult and Pediatric)  Goal: *Acute Goals and Plan of Care (Insert Text)  Physical Therapy Goals  Initiated 6/23/2017 and to be accomplished within 3-5 day(s)  1. Patient will move from supine <> sit with Mod-Min A in prep for out of bed activity and change of position. 2. Patient will perform sit<> stand with Mod A with rolling walker in prep for transfers/ambulation. 3. Patient will transfer from bed <> chair with Max A with rolling walker for time up in chair for completion of ADL activity. 4. Patient will ambulate 20-30 feet with LRAD/ Max A for increase functional mobility. Outcome: Progressing Towards Goal  PHYSICAL THERAPY TREATMENT     Patient: Emma Brandt (51 y.o. female)  Date: 6/26/2017  Diagnosis: Closed left hip fracture (HCC)  FRACTURED LEFT HIP Closed left hip fracture (HCC)  Procedure(s) (LRB):  INTRAMEDULLARY NAILING LEFT HIP WITH C-ARM  (Left) 4 Days Post-Op  Precautions: Fall, TTWB (O2, nausea)   Chart, physical therapy assessment, plan of care and goals were reviewed. ASSESSMENT:  Pt seen with OT this session to maximize pt/staff safety. Pt primary c/o is of nausea at this time (pt with several burps but no vomiting during session). Pt pleasant and cooperative, though has been confused previously. Pt spouse present and supportive. Pt requires max assist of 1-2 for transition supine to sit EOB, with total A to manage L LE. Tolerated sitting EOB with BP/O2sat WNL throughout. Initially dizzy upon sitting, but willing to attempt standing. Pt required max assist/verbal cues and assist to manage L LE for sit to stand. Standing trials attempted 2x ~3-4 seconds, with mod assist to maintain TTWB L LE. Pt with decreased activity tolerance. Returned to supine and left with bed in chair position and all needs in place. Pt /97 sitting; 144/83 5min post sitting EOB. O2 sat 100%. Nurse Nathalie Ochoa notified of above.   Will continue one to two times/day for achievement of goals as noted. Recommend rehab upon discharge. Progression toward goals:  [ ]      Improving appropriately and progressing toward goals  [X]      Improving slowly and progressing toward goals  [ ]      Not making progress toward goals and plan of care will be adjusted       PLAN:  Patient continues to benefit from skilled intervention to address the above impairments. Continue treatment per established plan of care. Discharge Recommendations:  Travis Escobar  Further Equipment Recommendations for Discharge:  rolling walker       SUBJECTIVE:   Patient stated I don't have pain, just nausea.       OBJECTIVE DATA SUMMARY:   Critical Behavior:  Neurologic State: Alert, Confused  Orientation Level: Oriented to person, Oriented to situation  Cognition: Decreased attention/concentration, Decreased command following, Impaired decision making, Memory loss, Poor safety awareness  Safety/Judgement: Decreased awareness of environment, Decreased awareness of need for assistance, Decreased awareness of need for safety, Decreased insight into deficits  Functional Mobility Training:  Bed Mobility:  Supine to Sit: Maximum assistance  Sit to Supine: Maximum assistance;Assist x2  Scooting: Maximum assistance  Transfers:  Sit to Stand: Maximum assistance; Additional time  Stand to Sit: Minimum assistance  Balance:  Sitting: Impaired  Sitting - Static: Fair (occasional)  Standing: Impaired; With support  Standing - Static: Fair;Constant support; Other (comment) (very limited time)  Pain:  Pain Scale 1: Numeric (0 - 10)  Pain Intensity 1: 0  Activity Tolerance:   Fair   Please refer to the flowsheet for vital signs taken during this treatment.   After treatment:   [ ] Patient left in no apparent distress sitting up in chair  [X] Patient left in no apparent distress in bed with bed in chair position  [X] Call bell left within reach  [X] Nursing notified  [X] Caregiver present  [ ] Bed alarm activated      Marsh Altes Magdalena PT   Time Calculation: 27 mins

## 2017-06-26 NOTE — PROGRESS NOTES
Daily Progress Note: 6/26/2017 1:37 PM   Admit Date: 6/21/2017    Patient seen in follow up for multiple medical problems as listed below:  Patient Active Problem List   Diagnosis Code    Myelodysplasia (myelodysplastic syndrome) ([facility] D46.9    Crohn's disease ([facility] K50.90    Hypertension I10    CKD (chronic kidney disease) stage 3, GFR 30-59 ml/min N18.3    Closed left hip fracture (HCC) S72.002A    Hyponatremia E87.1    Dementia F03.90    Anemia D64.9       Assesment     65 yo female with  CML, hyponatremia, hypokalemia, fell at home and fractured her left hip  Her surgery was deferred until 6/22 so her sodium and anemia + thrombocytopenia could be corrected. VOA consulting. S/o L hip IM nailing 6/22 Dr Willie Helton. Hg to 6.7 on 6/25 requiring 2u PRBC. Will need weekly CBCs.     Hyponatremia-resolved. Thought due to intake. No offending meds. Fluid restrict. Get Urine Na/ Osm. TSH low nl. Cortisol up. Post-op hypoxia - due to atelectasis. Wean off 02 6/25. Duonebs for exp eheezing   Left hip fracture s/p repair-PT with ltd weight bearing,  platelets being low increase her bleeding risk-will go to SNF for d/c when ready  Dementia- mild -moderate  Hyperglycemia- accuchecks with SSI, check A1C, not know to be diabetic  Crohns with colostomy  CMML -VOA consulting. Some reactive WBC elevation to 37K. Post-surgical blood loss anemia-2 units PRBC plus platelets 5/09, 6/86  Hypokalemia-PO and IV Kcl repeleted  CKD 3    DVT Protocol Active: start Hep TID. Recommend daily lovenox for 14d  Code Status:  Full Code     Disposition: SNF when approved    Subjective:     CC: Syncope and Hip Pain    Interval History: heme occult neg. Hg up to 10.6.  Work on mobility, OOB in chair and placement    ROS: 11 point ROS negative except for hip pain, dementia    Objective:     Visit Vitals    /79 (BP 1 Location: Right arm, BP Patient Position: At rest)    Pulse 95    Temp 98.3 °F (36.8 °C)    Resp 16    Ht 4' 11\" (1.499 m)    Wt 48 kg (105 lb 13.1 oz)    SpO2 100%    BMI 21.37 kg/m2       Temp (24hrs), Av.8 °F (36.6 °C), Min:97.1 °F (36.2 °C), Max:99.1 °F (37.3 °C)        Intake/Output Summary (Last 24 hours) at 17 1053  Last data filed at 17 0853   Gross per 24 hour   Intake             2601 ml   Output             1175 ml   Net             1426 ml       Gen: AOx1-2, NAD  HEENT:  SCOTT, EOMI. Neck: No Bruits/JVD   Lungs:   End exp wheeze. Good respiratory effort  Heart:   RR S1 S2 without M/R/G  Abdomen: ND,NT, BSX4,   Extremities:   No LE edema. No cyanosis.   Skin:  no jaundice/lesions      Data Review:     Meds/Labs/Tests reviewed    Current Shift:   07 - 1900  In: 120 [P.O.:120]  Out: -   Last three shifts:  1901 -  0700  In: 4604 [P.O.:970; I.V.:3201]  Out: 2125 [Urine:1075]  Recent Labs      17   0545  17   01417   2110   WBC  32.6*  36.8*  37.5*  35.4*   RBC  3.52*  2.12*  2.45*  2.56*   HGB  10.6*  6.7*  7.8*  8.2*   HCT  31.0*  19.2*  22.0*  22.9*   PLT  79*  79*  80*  82*   GRANS   --    --    --   59   LYMPH   --    --    --   13*   EOS   --    --    --   0       Recent Labs      17   0545  17   01417   2110   BUN  21*  18  15  15   CREA  1.59*  1.21  1.54*  1.62*   CA  8.1*  7.5*  7.5*  7.3*   K  4.5  4.5  5.3  5.0   NA  135*  134*  131*  131*   CL  106  106  103  103   CO2  21  18*  20*  20*   GLU  105*  89  120*  131*        Lab Results   Component Value Date/Time    Glucose 105 2017 04:38 AM    Glucose 89 2017 05:45 AM    Glucose 120 2017 01:45 AM    Glucose 131 2017 09:10 PM    Glucose 211 2017 02:17 AM          Care coordination with Nursing/Consultants/staff: 15  Prior history, labs, and charting reviewed: 10    Procedures/Imaging:  pCXR   XR L knee and Hip  Hip surgery   Heme Occult  neg    Total time spent with chart review, patient examination/education, discussion with staff on case,documentation and medication management / adjustment  :  27 Minutes      Dr Ronnie Buckley  Pager: [phone]

## 2017-06-26 NOTE — DISCHARGE SUMMARY
[facility]  Hospitalist Division    Discharge Summary      Patient: Antonina Wiseman MRN: [id number]  CSN: [id number]    YOB: 1940  Age: 64 y.o. Sex: female    DOA: 6/21/2017 LOS:  LOS: 5 days   Discharge Date: 06/26/17     PCP:  Chloe Mena MD    Chief Complaint:    Chief Complaint   Patient presents with   Lazaro Parisian Syncope    Hip Pain     Closed left hip fracture [facility])    Admission Diagnosis:   Hospital Problems as of 6/26/2017  Date Reviewed: 6/21/2017          Codes Class Noted - Resolved POA    Anemia ICD-10-CM: D64.9  ICD-9-CM: 285.9  6/22/2017 - Present Unknown        * (Principal)Closed left hip fracture ([facility] ICD-10-CM: T75.189K  ICD-9-CM: 820.8  6/21/2017 - Present Yes        Hyponatremia ICD-10-CM: E87.1  ICD-9-CM: 276.1  6/21/2017 - Present Unknown        Dementia ICD-10-CM: F03.90  ICD-9-CM: 294.20  6/21/2017 - Present Unknown        Myelodysplasia (myelodysplastic syndrome) ([facility] ICD-10-CM: D46.9  ICD-9-CM: 238.75  Unknown - Present Yes        Crohn's disease ([facility] ICD-10-CM: K50.90  ICD-9-CM: 555.9  Unknown - Present Yes        Hypertension ICD-10-CM: I10  ICD-9-CM: 401.9  Unknown - Present Yes        CKD (chronic kidney disease) stage 3, GFR 30-59 ml/min ICD-10-CM: N18.3  ICD-9-CM: 188. 3  Unknown - Present Yes    Overview Signed 2/20/2016  5:07 PM by Eddie Taveras MD     stage 3   from stone                   Discharge Diagnoses:    Hyponatremia-resolved. Thought due to intake. No offending meds. Fluid restrict. Get Urine Na/ Osm. TSH low nl. Cortisol up. Post-op hypoxia - due to atelectasis. Wean off 02 6/25. Duonebs for exp eheezing   Left hip fracture s/p repair-PT with ltd weight bearing,  platelets being low increase her bleeding risk-will go to SNF for d/c when ready  Dementia- mild -moderate  Hyperglycemia- accuchecks with SSI, check A1C, not know to be diabetic  Crohns with colostomy  CMML -VOA consulting. Some reactive WBC elevation to 37K. Post-surgical blood loss anemia-2 units PRBC plus platelets 6/24, 0/27  Hypokalemia-PO and IV Kcl repeleted  CKD 3 -baseline Cr 1.2-1.6  Hospital Course:   63 yo female with  CML, hyponatremia, hypokalemia, fell at home and fractured her left hip  Her surgery was deferred until 6/22 so her sodium and anemia + thrombocytopenia could be corrected. VOA consulting. S/p L hip IM nailing 6/22 Dr Umm Baugh. Hg to 6.7 on 6/25 requiring 2u PRBC. VOA consulting, patient will need weekly CBCs. She had some wheezing 6/25 that responded well to breathing treatments and she has been off and on oxygen the last 24hrs. Will add albuterol and recommend 2L 02 with monitoring. She has no infections. Due to her CMML she needs weekly CBCs reported to her hematologist at [facility]. 15. She has mild to moderate dementia but has been extremely pleasant.       Significant Diagnostic Studies:  pCXR 6/21  XR L knee and Hip  Heme Occult 6/26 neg  Operative Procedures:  Hip surgery 6/22-  L hip IM nailing 6/22 Dr Denis La:  Surgery  Oncology/Hematology    Diet:  Regular  Activity:  See PT notes. Up with assist  Discharge Condition:   good  Equipment needed  2L 02 prn  Wound Care:   NA        Discharge Medications:    Current Discharge Medication List      START taking these medications    Details   donepezil (ARICEPT) 5 mg tablet Take 1 Tab by mouth nightly. Qty: 60 Tab, Refills: 0      diazePAM (VALIUM) 2 mg tablet Take 1 Tab by mouth every six (6) hours as needed (spasms). Max Daily Amount: 8 mg. Qty: 20 Tab, Refills: 0      albuterol (PROVENTIL HFA, VENTOLIN HFA, PROAIR HFA) 90 mcg/actuation inhaler Take 2 Puffs by inhalation every four (4) hours as needed for Wheezing. Qty: 1 Inhaler, Refills: 1         CONTINUE these medications which have CHANGED    Details   oxyCODONE-acetaminophen (PERCOCET) 5-325 mg per tablet Take 1 Tab by mouth every four (4) hours as needed for Pain. Max Daily Amount: 6 Tabs.   Qty: 15 Tab, Refills: 0 CONTINUE these medications which have NOT CHANGED    Details   raNITIdine (ZANTAC) 150 mg tablet Take 150 mg by mouth two (2) times a day. ondansetron hcl (ZOFRAN, AS HYDROCHLORIDE,) 4 mg tablet Take 4 mg by mouth every twelve (12) hours as needed for Nausea. multivitamin (ONE A DAY) tablet Take 1 Tab by mouth daily. Indications: VITAMIN DEFICIENCY      !! ergocalciferol (VITAMIN D2) 50,000 unit capsule Take 50,000 Units by mouth.      sodium chloride 1 gram tablet Take 1 g by mouth daily. Cetirizine (ZYRTEC) 10 mg cap Take  by mouth as needed. !! ergocalciferol, vitamin d2, 1,000 unit cap Take  by mouth.      latanoprost (XALATAN) 0.005 % ophthalmic solution Administer 1 Drop to both eyes nightly.      ezetimibe (ZETIA) 10 mg tablet Take  by mouth.      pantoprazole (PROTONIX) 40 mg tablet Take 1 Tab by mouth daily. Qty: 30 Tab, Refills: 2      potassium & sodium citrate-citric acid (POLYCITRA-LC) 550-500-334 mg/5 mL solution Take 30 mL by mouth three (3) times daily (with meals). valACYclovir (VALTREX) 500 mg tablet Take 1,000 mg by mouth two (2) times a day. !! - Potential duplicate medications found. Please discuss with provider. Follow-Up And Discharge Instructions:    Follow-up Information     Follow up With Details 29 Souleymane Peres MD   [address] [id number] [facility],[address]  [phone]      Beni Enamorado DO Schedule an appointment as soon as possible for a visit Ortho follow-up [address] [place] [address]      Otto Marvin MD Call with [facility] and for follow-up [address] [address]  [phone]          To Do:  CBC q week and called to Dr Vignesh Duke        Time Spent:  35m    Cc: Campos Vasques MD

## 2017-06-26 NOTE — PROGRESS NOTES
Patient accepted at [facility] if medically stable facility can accept today.

## 2017-06-26 NOTE — ROUTINE PROCESS
Alarm parameters reviewed, on and audible Appropriate for patient clinical condition. Dual AVS reviewed with Shaista Wilkinson RN. All medications reviewed individually with patient. Opportunities for questions and concerns provided. Patient discharged via (mode of transport ie. Car, ambulance or air transport) Medical transport  Patient's arm band appropriately discarded. 1730: PT discharged alert and verbal without any signs of distress. Attempted to call report to [facility], and was transferred to admission nurse's phone and reached a voicemail box. Will attempt to call again. 1751: Attempted to call report again and was unsuccessful at reaching admissions nurse. Left her a message in hopes that she will return call for report.

## 2017-06-26 NOTE — PROGRESS NOTES
7116- Assumed care of patient at this time. Report received from Jesse HAQUERN. Patient in bed,  at bedside, denies pain and discomfort. Call bell left within reach. 5030- Patient complained of nausea, PRN Zofran 4 mg IV given at this time. Will monitor for effectiveness. 3610 Patient in bed this time, patient wants to wait to take AM medications due to nausea. Heparin given as ordered. Patient is  A/O x 3. Lungs clear, abdomen tight and slightly distended, she states it has been this way. Bowel sounds active, colostomy in place to RUQ, 22 G IV to right arm, infusing without difficulity. No signs of phlebitis or infiltration noted. Oxygen via nasal cannula at 2L. Skin warm ,dry with mepilex dressing x3 to left hip, small amount of old drainage noted to upper hip dressing. Swelling to bilateral feet and ankles. Patient denies pain or discomfort. Bed placed in lowest position, call bell within reach.

## 2017-06-26 NOTE — PROGRESS NOTES
When discharged pt will be going to [facility] [address] [facility] [employer] [facility],please send avs d/c summary,medication hard scripts,Rn please call report prior to pt leaving @ [phone] for discharge possible today but likely tomorrow, at bedside aware of plan. Care Management Interventions  PCP Verified by CM: Yes  Palliative Care Consult (Criteria: CHF and RRAT>21): No  Reason for No Palliative Care Consult:  Other (see comment)  Mode of Transport at Discharge: BLS  Transition of Care Consult (CM Consult): SNF  [facility] - INPATIENT: No  Partner SNF: Yes  Discharge Durable Medical Equipment: No  Health Maintenance Reviewed: Yes  Physical Therapy Consult: Yes  Occupational Therapy Consult: Yes  Speech Therapy Consult: No  Current Support Network: Lives with Spouse, Nursing Facility  Confirm Follow Up Transport: Self  Plan discussed with Pt/Family/Caregiver: Yes  Freedom of Choice Offered: Yes  Discharge Location  Discharge Placement: Skilled nursing facility

## 2017-06-26 NOTE — PROGRESS NOTES
Phone: [phone]  Paging : [phone]      Hematology / Oncology Progress Note    Admit Date: 2017    Assessment:     Principal Problem:    Closed left hip fracture ([address] (2017)    Active Problems:    Myelodysplasia (myelodysplastic syndrome) (HCC) ()   CMML (chronic myelomonocytic leukemia)      Crohn's disease (HCC) ()      Hypertension ()      CKD (chronic kidney disease) stage 3, GFR 30-59 ml/min ()      Overview: stage 3   from stone      Hyponatremia (2017)      Dementia (2017)      Anemia (2017)        Plan:     CBC looks good today, no f;urther recommendations. If discharged to rehab, will need CBC q week and called to Dr Juani Strange    Subjective:     I've been better. Objective:     Patient Vitals for the past 24 hrs:   BP Temp Pulse Resp SpO2 Weight   17 0738 138/79 98.3 °F (36.8 °C) 95 16 100 % -   17 0717 - - - - 98 % -   17 0331 132/80 97.8 °F (36.6 °C) 87 17 98 % 48 kg (105 lb 13.1 oz)   17 2308 142/58 97.3 °F (36.3 °C) 80 18 99 % -   17 1914 155/82 97.9 °F (36.6 °C) 85 18 100 % -   17 1520 121/59 97.6 °F (36.4 °C) 81 18 100 % -   17 1504 112/57 97.6 °F (36.4 °C) 81 18 100 % -   17 1230 107/65 99.1 °F (37.3 °C) 90 18 100 % -   17 1202 125/69 97.1 °F (36.2 °C) 96 18 100 % -   17 1148 - - - - 99 % -   17 1041 137/71 97.8 °F (36.6 °C) 93 17 99 % -         Intake/Output Summary (Last 24 hours) at 17 0824  Last data filed at 17 6240   Gross per 24 hour   Intake             2731 ml   Output             1775 ml   Net              956 ml       Temp (24hrs), Av.8 °F (36.6 °C), Min:97.1 °F (36.2 °C), Max:99.1 °F (37.3 °C)             Exam:  General:  alert, cooperative, no distress, appears stated age  HEENT: Head: Normocephalic, no lesions, without obvious abnormality.   Lymphatic:  Cervical, supraclavicular, and axillary nodes normal., no lymphadenopathy     Lungs: clear to auscultation bilaterally  Cardiovascular:  regular heart rate, no murmurs, no JVD  Pulses: 2+ and symmetric  Abdomen: Soft, non-tender and without masses or organomegaly  Musculoskeletal: dressing left lhip with some seepage, ecchymoses left hip posteriorly  Extremities: peripheral pulses normal, no pedal edema, no clubbing or cyanosis  Skin:  Normal.  Neurologic: normal      Recent Results (from the past 24 hour(s))   TYPE & CROSSMATCH    Collection Time: 06/25/17 10:05 AM   Result Value Ref Range    Crossmatch Expiration 06/28/2017     ABO/Rh(D) Janet Cortes POSITIVE     Antibody screen NEG     CALLED TO: TRACI AT 1129 ON 6/25/17 LAD     Unit number [id number]     Blood component type RC LR AS1     Unit division 00     Status of unit ISSUED     Crossmatch result Compatible     Unit number [id number]     Blood component type RC LR AS1     Unit division 00     Status of unit ISSUED     Crossmatch result Compatible    GLUCOSE, POC    Collection Time: 06/25/17 11:32 AM   Result Value Ref Range    Glucose (POC) 120 (H) 70 - 110 mg/dL   GLUCOSE, POC    Collection Time: 06/25/17  9:56 PM   Result Value Ref Range    Glucose (POC) 125 (H) 70 - 110 mg/dL   CBC W/O DIFF    Collection Time: 06/26/17  4:38 AM   Result Value Ref Range    WBC 32.6 (H) 4.6 - 13.2 K/uL    RBC 3.52 (L) 4.20 - 5.30 M/uL    HGB 10.6 (L) 12.0 - 16.0 g/dL    HCT 31.0 (L) 35.0 - 45.0 %    MCV 88.1 74.0 - 97.0 FL    MCH 30.1 24.0 - 34.0 PG    MCHC 34.2 31.0 - 37.0 g/dL    RDW 17.2 (H) 11.6 - 14.5 %    PLATELET 79 (L) 127 - 420 K/uL    MPV 11.1 9.2 - 77.4 FL   METABOLIC PANEL, BASIC    Collection Time: 06/26/17  4:38 AM   Result Value Ref Range    Sodium 135 (L) 136 - 145 mmol/L    Potassium 4.5 3.5 - 5.5 mmol/L    Chloride 106 100 - 108 mmol/L    CO2 21 21 - 32 mmol/L    Anion gap 8 3.0 - 18 mmol/L    Glucose 105 (H) 74 - 99 mg/dL    BUN 21 (H) 7.0 - 18 MG/DL    Creatinine 1.59 (H) 0.6 - 1.3 MG/DL    BUN/Creatinine ratio 13 12 - 20      GFR est AA 38 (L) >60 ml/min/1.73m2 GFR est non-AA 31 (L) >60 ml/min/1.73m2    Calcium 8.1 (L) 8.5 - 10.1 MG/DL   GLUCOSE, POC    Collection Time: 06/26/17  6:09 AM   Result Value Ref Range    Glucose (POC) 101 70 - 110 mg/dL   OCCULT BLOOD, STOOL    Collection Time: 06/26/17  7:20 AM   Result Value Ref Range    Occult blood, stool NEGATIVE  NEG         Amol Chow MD

## 2017-07-09 NOTE — OP NOTES
[address] [place] [address]  OPERATIVE REPORT    Name:  Yanique Boudreaux  MR#:  [id number]  :  1940  Account #:  [de-identified]  Date of Adm:  2017  Date of Surgery:  2017      PREOPERATIVE DIAGNOSIS: Left intertrochanteric fracture,  unstable. POSTOPERATIVE DIAGNOSIS: Left intertrochanteric fracture,  unstable. PROCEDURES PERFORMED: Intramedullary and cephalomedullary  fixation of left hip with fluoroscopy. SURGEON: Blue Cabrera MD    ANESTHESIA: General.    ESTIMATED BLOOD LOSS: 300 mL. FLUIDS: 1500. FINDINGS: Unstable intertrochanteric fracture of the left hip. COMPLICATIONS: None. IMPLANTS UTILIZED: A 10 x 340 x 130 left Gamma3 nail via 10.5 x  90 lag screw and a distal 5 x 35 static locked bone screw. SPECIMENS REMOVED: none    OPERATIVE COURSE: A 75-year-old presented after a short distant  fall at home, progressive difficulty with multiple medical morbidities. She had been evaluated by the medical team and she was admitted  and was noted to be grossly dehydrated and underwent 2 days of  resuscitation before juan for surgery. At that point, we came up with  the plans for fixation of the hip because of ongoing pain. She was then  evaluated in preoperative holding by Anesthesia. The risks and  benefits were reviewed with the patient and the patient's , who  was active power of . Consent was obtained and she was  taken to the surgical suite and placed in supine position on the traction  table. Undergoing general anesthesia and in a well leg epperson for the  right, the left was then prepped and draped in routine fashion. A  surgical timeout identified the patient and procedure. An incision was made in the proximal portion and gaining access to the  greater trochanteric tip with an awl. We introduced the guidewire  through the canal down to the level of the femur, gaining appropriate  length for fixation of the stem.  The nail was then selected and it was  over reamed sequentially to 11.5 to allow for passing through the  canal. Once this was completed, the selected femoral nail was then  inserted through the proximal portion distally gaining evaluation with  fluoroscopic images in the proximal and distal portions to appropriate  length and depth. Then it was secured in place with a lag screw which  was drilled to the lateral cortex and introduced into the neck and head  in center-center position. The depth of that was noted to be about 90  mm, appropriate fixation within the femoral head. It was then over-  drilled and reamed and then the lag screw was introduced through the  lateral portion through the stem into the head in the center-center  position as noted on AP and lateral views. With good secure fit, it was  then compressed and then locked in place approximately at the stem  at the proximal portion of the construct. At this point, the leg was  abducted laterally. We gained perfect [place] technique and introduced a  distal locking screw in static position. Once a gain, the cortex was  drilled and measured 35 mm at the appropriate length and introduced it  without difficulty. AP and lateral views confirmed screw position in the  distal static locking hole. At this point, instrumentation was removed. Irrigation was used for the  incision lines and it was reapproximated with a 1-0 Vicryl for the fascia,  2-0 Vicryl under the skin and Dermabond Prineo. The patient will  continue with DVT prophylaxis, appropriate resuscitation including  transfusion if needed and IV antibiotics for 24 hours. Patient is to have  weightbearing 25% on the left side.           MD Chris Spencer / Keke Macias  D:  07/08/2017   07:51  T:  07/09/2017   02:29  Job #:  [id number]

## 2023-01-23 NOTE — ED NOTES
Patients abrasions on left elbow, left knee and left calf were cleaned and bandaged. Pt tolerated procedure well. Fair

## (undated) DEVICE — (D)PREP SKN CHLRAPRP APPL 26ML -- CONVERT TO ITEM 371833

## (undated) DEVICE — GUIDE WIRE, BALL-TIPPED, STERILE

## (undated) DEVICE — SYRINGE BLB 50CC IRRIG PLIABLE FNGR FLNG GRAD FLSK DISP

## (undated) DEVICE — SOL IRRIGATION INJ NACL 0.9% 500ML BTL

## (undated) DEVICE — REAMER SHAFT, MOD.TRINKLE: Brand: BIXCUT

## (undated) DEVICE — SYSTEM SKIN CLSR 22CM DERMBND PRINEO

## (undated) DEVICE — SUT VCRL + 1 36IN CT1 VIO --

## (undated) DEVICE — SPONGE GZ W4XL4IN COT 12 PLY TYP VII WVN C FLD DSGN

## (undated) DEVICE — K-WIRE

## (undated) DEVICE — Z DISCONTINUED USE 2429233 DRESSING FOAM W10XL10CM 5 LAYR SELF ADH VERSATILE SAFETAC

## (undated) DEVICE — SPONGE LAP 18X18IN STRL -- 5/PK

## (undated) DEVICE — REM POLYHESIVE ADULT PATIENT RETURN ELECTRODE: Brand: VALLEYLAB

## (undated) DEVICE — SUT VCRL + 2-0 36IN CT1 UD --

## (undated) DEVICE — (D)PACK TOTAL HIP LF ANT APPRO -- DISC BY MFR USE ITEM 338838

## (undated) DEVICE — Z DISCONTINUED PER MEDLINE USE 2718072 DRESSING FOAM W5XL12.5CM SIL ADH THN BORDED CNFRM LO PROF